# Patient Record
Sex: FEMALE | Race: WHITE | Employment: FULL TIME | URBAN - METROPOLITAN AREA
[De-identification: names, ages, dates, MRNs, and addresses within clinical notes are randomized per-mention and may not be internally consistent; named-entity substitution may affect disease eponyms.]

---

## 2019-08-16 ENCOUNTER — HOSPITAL ENCOUNTER (OUTPATIENT)
Age: 34
Discharge: HOME | End: 2019-08-16
Attending: FAMILY MEDICINE
Payer: COMMERCIAL

## 2019-08-16 VITALS
TEMPERATURE: 99 F | OXYGEN SATURATION: 98 % | DIASTOLIC BLOOD PRESSURE: 82 MMHG | RESPIRATION RATE: 16 BRPM | HEART RATE: 83 BPM | BODY MASS INDEX: 30 KG/M2 | WEIGHT: 185 LBS | SYSTOLIC BLOOD PRESSURE: 144 MMHG

## 2019-08-16 DIAGNOSIS — L73.2 HIDRADENITIS SUPPURATIVA: Primary | ICD-10-CM

## 2019-08-16 PROCEDURE — 99203 OFFICE O/P NEW LOW 30 MIN: CPT

## 2019-08-16 RX ORDER — PREDNISONE 20 MG/1
40 TABLET ORAL DAILY
Qty: 10 TABLET | Refills: 0 | Status: SHIPPED | OUTPATIENT
Start: 2019-08-16 | End: 2019-08-21

## 2019-08-16 RX ORDER — METOPROLOL SUCCINATE 25 MG/1
25 TABLET, EXTENDED RELEASE ORAL DAILY
COMMUNITY
End: 2020-11-09 | Stop reason: DRUGHIGH

## 2019-08-16 RX ORDER — CEPHALEXIN 500 MG/1
500 CAPSULE ORAL 4 TIMES DAILY
Qty: 28 CAPSULE | Refills: 0 | Status: SHIPPED | OUTPATIENT
Start: 2019-08-16 | End: 2019-08-23

## 2019-08-16 NOTE — ED PROVIDER NOTES
Patient Seen in: [facility] [place]    History   Patient presents with:  Abscess (integumentary)    Stated Complaint: skin abscess    HPI    47-year-old female with a history of vaginitis or Adam presents to the IC secondary to flare.   Pa Exam    General: Pleasant female in no acute distress  Neuro: Alert or x3. No focal deficits  Vascular: Peripheral pulses are intact. Extremities: Full range of motion  Skin: 8 x 8 cm area of tenderness, erythema, and induration at the right groin.   Cent

## 2019-08-16 NOTE — ED INITIAL ASSESSMENT (HPI)
Pt states having a hidradenitis flare up today. Pt called dermatologist today but was unable to get in today. Pt is worried that it is becoming infected.

## 2020-10-19 ENCOUNTER — TELEPHONE (OUTPATIENT)
Dept: FAMILY MEDICINE CLINIC | Facility: CLINIC | Age: 35
End: 2020-10-19

## 2020-10-19 NOTE — TELEPHONE ENCOUNTER
Pt called to schedule with Dr Arnav Adamson. I explained  is not accepting new patients. Pt states she is a friend of Dr Arnav Adamson and would like me to send a message requesting visit.      Thank you

## 2020-11-09 ENCOUNTER — OFFICE VISIT (OUTPATIENT)
Dept: FAMILY MEDICINE CLINIC | Facility: CLINIC | Age: 35
End: 2020-11-09
Payer: COMMERCIAL

## 2020-11-09 VITALS
WEIGHT: 174 LBS | RESPIRATION RATE: 14 BRPM | DIASTOLIC BLOOD PRESSURE: 80 MMHG | TEMPERATURE: 98 F | OXYGEN SATURATION: 98 % | BODY MASS INDEX: 27.31 KG/M2 | HEART RATE: 101 BPM | SYSTOLIC BLOOD PRESSURE: 120 MMHG | HEIGHT: 66.75 IN

## 2020-11-09 DIAGNOSIS — F41.9 ANXIETY: ICD-10-CM

## 2020-11-09 DIAGNOSIS — Z23 NEED FOR VACCINATION: ICD-10-CM

## 2020-11-09 DIAGNOSIS — I10 ESSENTIAL HYPERTENSION: Primary | ICD-10-CM

## 2020-11-09 PROCEDURE — 3079F DIAST BP 80-89 MM HG: CPT | Performed by: FAMILY MEDICINE

## 2020-11-09 PROCEDURE — 99072 ADDL SUPL MATRL&STAF TM PHE: CPT | Performed by: FAMILY MEDICINE

## 2020-11-09 PROCEDURE — 3008F BODY MASS INDEX DOCD: CPT | Performed by: FAMILY MEDICINE

## 2020-11-09 PROCEDURE — 90471 IMMUNIZATION ADMIN: CPT | Performed by: FAMILY MEDICINE

## 2020-11-09 PROCEDURE — 3074F SYST BP LT 130 MM HG: CPT | Performed by: FAMILY MEDICINE

## 2020-11-09 PROCEDURE — 99203 OFFICE O/P NEW LOW 30 MIN: CPT | Performed by: FAMILY MEDICINE

## 2020-11-09 PROCEDURE — 90686 IIV4 VACC NO PRSV 0.5 ML IM: CPT | Performed by: FAMILY MEDICINE

## 2020-11-09 RX ORDER — SERTRALINE HYDROCHLORIDE 100 MG/1
200 TABLET, FILM COATED ORAL DAILY
COMMUNITY
Start: 2019-01-31 | End: 2021-01-21

## 2020-11-09 NOTE — PROGRESS NOTES
Rosendo Brooks is a 37year old female. HPI:   Patient here to establish care. Her  sees me. My  Herb Shelton with her dad. Has been on sertraline since 2017 when she had antepartum depression, but has kept on sertraline to manage anxiety. Drinks per session: 1 or 2      Binge frequency: Never    Drug use: Yes      Types: Cannabis      Comment: weekends         REVIEW OF SYSTEMS:   GENERAL HEALTH: feels well otherwise   SKIN: denies any unusual skin lesions or rashes  HEENT: no URI symptoms

## 2021-01-21 ENCOUNTER — TELEPHONE (OUTPATIENT)
Dept: FAMILY MEDICINE CLINIC | Facility: CLINIC | Age: 36
End: 2021-01-21

## 2021-01-21 RX ORDER — SERTRALINE HYDROCHLORIDE 100 MG/1
100 TABLET, FILM COATED ORAL 2 TIMES DAILY
Qty: 180 TABLET | Refills: 2 | Status: SHIPPED | OUTPATIENT
Start: 2021-01-21 | End: 2021-01-22

## 2021-01-21 NOTE — TELEPHONE ENCOUNTER
Patient said about a month ago Dr. Arnav Adamson wrote scripts for Metoprolo and Sertraline but she never filled them. Has decided she would like to start them now. [facility] [place]. Please call back.

## 2021-01-21 NOTE — TELEPHONE ENCOUNTER
Per our office visit note she was already on sertraline and metorpolol (prescribed by another doc) and doing well on them and was going to call for refills when needed.   Please verify what dosages she is taking and fine to refill at those dosages for #90 2

## 2021-01-21 NOTE — TELEPHONE ENCOUNTER
Patient notified and verbalized understanding.    States she is taking:    Sertraline 100 mg BID  Metoprolol tartrate 25 mg BID     Uses [facility] on [address]    Routed to [address] to advise on refill

## 2021-01-22 RX ORDER — SERTRALINE HYDROCHLORIDE 100 MG/1
100 TABLET, FILM COATED ORAL 2 TIMES DAILY
Qty: 180 TABLET | Refills: 2 | Status: SHIPPED | OUTPATIENT
Start: 2021-01-22 | End: 2021-04-16

## 2021-01-22 NOTE — TELEPHONE ENCOUNTER
[address] [facility] called to clarify directions on the sertraline  Had them cancel the order as the notes [facility]    I called the pt to clarify the directions  She states that she takes the sertraline 100mg BID and the metoprolol BI

## 2021-04-15 ENCOUNTER — TELEPHONE (OUTPATIENT)
Dept: FAMILY MEDICINE CLINIC | Facility: CLINIC | Age: 36
End: 2021-04-15

## 2021-04-15 NOTE — TELEPHONE ENCOUNTER
Pt called, needs refills on Sertraline HCl 100 MG Oral Tab and metoprolol Tartrate 25 MG Oral Tab. Pharmacy-[facility] [address].   Please call pt at [phone]

## 2021-04-15 NOTE — TELEPHONE ENCOUNTER
Pt called, just got her first covid shot this morning and has a headache now. What can pt take for headache?   Please call pt at [phone]

## 2021-04-15 NOTE — TELEPHONE ENCOUNTER
LORENA    Pt reports she received covid vaccine this morning and now has headache. Was just inquiring to see if ibuprofen was okay to take with her hypertension.  Pt advised that headache is common side effect and to start with acetaminophen / tylenol for pain

## 2021-04-15 NOTE — TELEPHONE ENCOUNTER
LOV: 11/9/20   Last Refill:   Metoprolol 01/22/21 #180 2   Sertraline 01/22/21  #180 2     Pt requested meds be sent to new pharmacy

## 2021-04-16 RX ORDER — SERTRALINE HYDROCHLORIDE 100 MG/1
100 TABLET, FILM COATED ORAL 2 TIMES DAILY
Qty: 180 TABLET | Refills: 2 | Status: SHIPPED | OUTPATIENT
Start: 2021-04-16 | End: 2021-08-24

## 2021-04-16 NOTE — TELEPHONE ENCOUNTER
Hypertension Medications Protocol [id number]/16/2021 10:09 AM   CMP or BMP in past 12 months Protocol Details    Last serum creatinine< 2.0     Appointment in past 6 or next 3 months        Last refilled on 01/22/2021 for # 180 with 2 rf for both.    Last lab

## 2021-04-16 NOTE — TELEPHONE ENCOUNTER
Pt called checking status on refill and if we can get the refill called in today as pt is totally out.   Please call pt at [phone]

## 2021-07-12 ENCOUNTER — TELEPHONE (OUTPATIENT)
Dept: FAMILY MEDICINE CLINIC | Facility: CLINIC | Age: 36
End: 2021-07-12

## 2021-07-12 RX ORDER — SULFAMETHOXAZOLE AND TRIMETHOPRIM 800; 160 MG/1; MG/1
1 TABLET ORAL 2 TIMES DAILY
Qty: 6 TABLET | Refills: 0 | Status: SHIPPED | OUTPATIENT
Start: 2021-07-12 | End: 2021-07-15

## 2021-07-12 NOTE — TELEPHONE ENCOUNTER
PT CALLED AND ADV TYPICAL UTI SX'S. HAS BEEN TAKING AZO'S THE LAST 3 DAYS. OFFERED APT WITH [facility] AND ADV CAN'T COME IN-PT HAS KIDS AT HOME. LOOKING TO SEE IF SOMETHING CAN BE CALLED IN.     PLEASE ADV    THANK YOU

## 2021-07-12 NOTE — TELEPHONE ENCOUNTER
Patient has urgency and frequency and a little discomfort. Patient states can not come in due to sleeping children and would like something called in.   Patient states that hs been a while since having a UTI and cant remember what has taken before in the p

## 2021-07-12 NOTE — TELEPHONE ENCOUNTER
Please find out what symptoms she is having. Burning with urination? Frequency? Blood in urine? Fever? Nausea/vomiting? Flank or back pain?   If just the first 2 is likely UTI and ideally we send a sample to culture that was if first abx doesn't work we'l

## 2021-07-15 ENCOUNTER — TELEPHONE (OUTPATIENT)
Dept: FAMILY MEDICINE CLINIC | Facility: CLINIC | Age: 36
End: 2021-07-15

## 2021-07-15 ENCOUNTER — NURSE ONLY (OUTPATIENT)
Dept: FAMILY MEDICINE CLINIC | Facility: CLINIC | Age: 36
End: 2021-07-15
Payer: COMMERCIAL

## 2021-07-15 DIAGNOSIS — R35.0 FREQUENCY OF URINATION: ICD-10-CM

## 2021-07-15 DIAGNOSIS — R30.0 DYSURIA: ICD-10-CM

## 2021-07-15 DIAGNOSIS — R30.0 DYSURIA: Primary | ICD-10-CM

## 2021-07-15 LAB
BILIRUBIN: NEGATIVE
GLUCOSE (URINE DIPSTICK): NEGATIVE MG/DL
KETONES (URINE DIPSTICK): NEGATIVE MG/DL
MULTISTIX LOT#: 5077 NUMERIC
NITRITE, URINE: NEGATIVE
PH, URINE: 6 (ref 4.5–8)
PROTEIN (URINE DIPSTICK): 100 MG/DL
SPECIFIC GRAVITY: 1.03 (ref 1–1.03)
URINE-COLOR: YELLOW
UROBILINOGEN,SEMI-QN: 0.2 MG/DL (ref 0–1.9)

## 2021-07-15 PROCEDURE — 81003 URINALYSIS AUTO W/O SCOPE: CPT | Performed by: FAMILY MEDICINE

## 2021-07-15 PROCEDURE — 87086 URINE CULTURE/COLONY COUNT: CPT | Performed by: FAMILY MEDICINE

## 2021-07-15 PROCEDURE — 87186 SC STD MICRODIL/AGAR DIL: CPT | Performed by: FAMILY MEDICINE

## 2021-07-15 PROCEDURE — 87077 CULTURE AEROBIC IDENTIFY: CPT | Performed by: FAMILY MEDICINE

## 2021-07-15 RX ORDER — SULFAMETHOXAZOLE AND TRIMETHOPRIM 800; 160 MG/1; MG/1
1 TABLET ORAL 2 TIMES DAILY
Qty: 8 TABLET | Refills: 0 | Status: SHIPPED | OUTPATIENT
Start: 2021-07-15 | End: 2021-07-19

## 2021-07-15 NOTE — TELEPHONE ENCOUNTER
Reports she is taking her last dose of bactrim today, a 3 day regimen. The sx have decreased minimally but have not worsened. Was unable to come in for UA 7/12. C/O frequency, urgency, and discomfort. Denies any visible blood.

## 2021-07-15 NOTE — TELEPHONE ENCOUNTER
Having started the Bactrim, her Urine testing might be skewed, nevertheless I think its worth considering she is continuing to have her symptoms. Urine dip and urine Cx recommended.  Also while we are waiting on the URINE CX results - we can extend Bactrim

## 2021-07-15 NOTE — TELEPHONE ENCOUNTER
Pt called she just took her last pill of   Sulfamethoxazole-TMP DS (BACTRIM DS) 800-160 MG Oral Tab per tablet . She states that her symptoms are lessening but haven't gone away. She is thinking that she needs to come in for a urine sample.

## 2021-07-19 RX ORDER — NITROFURANTOIN 25; 75 MG/1; MG/1
100 CAPSULE ORAL 2 TIMES DAILY
Qty: 14 CAPSULE | Refills: 0 | Status: SHIPPED | OUTPATIENT
Start: 2021-07-19 | End: 2021-07-26

## 2021-08-24 ENCOUNTER — APPOINTMENT (OUTPATIENT)
Dept: CT IMAGING | Age: 36
End: 2021-08-24
Attending: NURSE PRACTITIONER
Payer: COMMERCIAL

## 2021-08-24 ENCOUNTER — HOSPITAL ENCOUNTER (OUTPATIENT)
Age: 36
Discharge: HOME | End: 2021-08-24
Payer: COMMERCIAL

## 2021-08-24 VITALS
HEART RATE: 62 BPM | HEIGHT: 67 IN | SYSTOLIC BLOOD PRESSURE: 141 MMHG | OXYGEN SATURATION: 97 % | DIASTOLIC BLOOD PRESSURE: 66 MMHG | WEIGHT: 170 LBS | TEMPERATURE: 98 F | RESPIRATION RATE: 22 BRPM | BODY MASS INDEX: 26.68 KG/M2

## 2021-08-24 DIAGNOSIS — R55 SYNCOPE, NEAR: ICD-10-CM

## 2021-08-24 DIAGNOSIS — F41.9 ANXIETY: ICD-10-CM

## 2021-08-24 DIAGNOSIS — R25.1 SHAKY: Primary | ICD-10-CM

## 2021-08-24 LAB
#MXD IC: 0.4 X10ˆ3/UL (ref 0.1–1)
ATRIAL RATE: 83 BPM
BUN BLD-MCNC: 9 MG/DL (ref 7–18)
CHLORIDE BLD-SCNC: 101 MMOL/L (ref 98–112)
CO2 BLD-SCNC: 21 MMOL/L (ref 21–32)
CREAT BLD-MCNC: 0.6 MG/DL
DDIMER WHOLE BLOOD: <200 NG/ML DDU (ref ?–400)
GLUCOSE BLD-MCNC: 82 MG/DL (ref 70–99)
HCT VFR BLD AUTO: 42.4 %
HCT VFR BLD CALC: 44 %
HGB BLD-MCNC: 15 G/DL
ISTAT IONIZED CALCIUM FOR CHEM 8: 1.18 MMOL/L (ref 1.12–1.32)
LYMPHOCYTES # BLD AUTO: 1.4 X10ˆ3/UL (ref 1–4)
LYMPHOCYTES NFR BLD AUTO: 16.5 %
MCH RBC QN AUTO: 32.9 PG (ref 26–34)
MCHC RBC AUTO-ENTMCNC: 35.4 G/DL (ref 31–37)
MCV RBC AUTO: 93 FL (ref 80–100)
MIXED CELL %: 4.6 %
NEUTROPHILS # BLD AUTO: 6.5 X10ˆ3/UL (ref 1.5–7.7)
NEUTROPHILS NFR BLD AUTO: 78.9 %
P AXIS: 62 DEGREES
P-R INTERVAL: 140 MS
PLATELET # BLD AUTO: 240 X10ˆ3/UL (ref 150–450)
POTASSIUM BLD-SCNC: 3.9 MMOL/L (ref 3.6–5.1)
Q-T INTERVAL: 382 MS
QRS DURATION: 94 MS
QTC CALCULATION (BEZET): 448 MS
R AXIS: 63 DEGREES
RBC # BLD AUTO: 4.56 X10ˆ6/UL
SARS-COV-2 RNA RESP QL NAA+PROBE: NOT DETECTED
SODIUM BLD-SCNC: 137 MMOL/L (ref 136–145)
T AXIS: 57 DEGREES
T4 FREE SERPL-MCNC: 0.8 NG/DL (ref 0.8–1.7)
TROPONIN I BLD-MCNC: <0.02 NG/ML
TSI SER-ACNC: 0.81 MIU/ML (ref 0.36–3.74)
VENTRICULAR RATE: 83 BPM
WBC # BLD AUTO: 8.3 X10ˆ3/UL (ref 4–11)

## 2021-08-24 PROCEDURE — 80047 BASIC METABLC PNL IONIZED CA: CPT | Performed by: NURSE PRACTITIONER

## 2021-08-24 PROCEDURE — 93000 ELECTROCARDIOGRAM COMPLETE: CPT | Performed by: NURSE PRACTITIONER

## 2021-08-24 PROCEDURE — 70450 CT HEAD/BRAIN W/O DYE: CPT | Performed by: NURSE PRACTITIONER

## 2021-08-24 PROCEDURE — 99204 OFFICE O/P NEW MOD 45 MIN: CPT | Performed by: NURSE PRACTITIONER

## 2021-08-24 PROCEDURE — 85025 COMPLETE CBC W/AUTO DIFF WBC: CPT | Performed by: NURSE PRACTITIONER

## 2021-08-24 PROCEDURE — U0002 COVID-19 LAB TEST NON-CDC: HCPCS | Performed by: NURSE PRACTITIONER

## 2021-08-24 PROCEDURE — 84443 ASSAY THYROID STIM HORMONE: CPT | Performed by: NURSE PRACTITIONER

## 2021-08-24 PROCEDURE — 85378 FIBRIN DEGRADE SEMIQUANT: CPT | Performed by: NURSE PRACTITIONER

## 2021-08-24 PROCEDURE — 84484 ASSAY OF TROPONIN QUANT: CPT | Performed by: NURSE PRACTITIONER

## 2021-08-24 PROCEDURE — 84439 ASSAY OF FREE THYROXINE: CPT | Performed by: NURSE PRACTITIONER

## 2021-08-24 RX ORDER — SODIUM CHLORIDE 9 MG/ML
1000 INJECTION, SOLUTION INTRAVENOUS ONCE
Status: COMPLETED | OUTPATIENT
Start: 2021-08-24 | End: 2021-08-24

## 2021-08-24 NOTE — ED INITIAL ASSESSMENT (HPI)
Not feeling well for 3 days. Very sweaty, this am shaky, dizzy, lightheaded. Woke this am with a cold sweat. Had to lay down, she felt she was going to pass out. Has not eaten.

## 2021-08-24 NOTE — ED PROVIDER NOTES
Patient Seen in: [facility]      History   No chief complaint on file.     Stated Complaint: shaky/blurred vision/weakness/sweats    HPI/Subjective:   68-year-old female who presents to the IC after suddenly waking up this morning with her husb Tobacco Use      Smoking status: Former Smoker        Packs/day: 0.50        Years: 9.00        Pack years: 4.5        Types: Cigarettes      Smokeless tobacco: Never Used      Tobacco comment: quit 11/07    Vaping Use      Vaping Use: Every day        Sub awake, alert, and oriented. The patient is cooperative. The patient has no focal neurologic deficits. The patient has normal speech and gait. MUSCULOSKELETAL: There is no tenderness or deformity. There is no limitation range of motion.   There is no ev of acute inflammation. SKULL:             No evidence for fracture or osseous abnormality. OTHER:             None. CONCLUSION:  There is no acute abnormality on the noncontrast CT of the head.    Dictated by (CST): Jaiden Velasquez MD on 8/24/2021

## 2021-08-25 ENCOUNTER — TELEPHONE (OUTPATIENT)
Dept: FAMILY MEDICINE CLINIC | Facility: CLINIC | Age: 36
End: 2021-08-25

## 2021-08-30 ENCOUNTER — OFFICE VISIT (OUTPATIENT)
Dept: FAMILY MEDICINE CLINIC | Facility: CLINIC | Age: 36
End: 2021-08-30
Payer: COMMERCIAL

## 2021-08-30 VITALS
WEIGHT: 166 LBS | OXYGEN SATURATION: 99 % | HEIGHT: 67 IN | HEART RATE: 77 BPM | DIASTOLIC BLOOD PRESSURE: 80 MMHG | BODY MASS INDEX: 26.06 KG/M2 | TEMPERATURE: 98 F | SYSTOLIC BLOOD PRESSURE: 120 MMHG

## 2021-08-30 DIAGNOSIS — F41.9 ANXIETY: ICD-10-CM

## 2021-08-30 DIAGNOSIS — Z13.1 DIABETES MELLITUS SCREENING: ICD-10-CM

## 2021-08-30 DIAGNOSIS — I10 ESSENTIAL HYPERTENSION: ICD-10-CM

## 2021-08-30 DIAGNOSIS — Z13.220 LIPID SCREENING: Primary | ICD-10-CM

## 2021-08-30 LAB
CHOLEST SMN-MCNC: 205 MG/DL (ref ?–200)
HDLC SERPL-MCNC: 55 MG/DL (ref 40–59)
LDLC SERPL CALC-MCNC: 134 MG/DL (ref ?–100)
NONHDLC SERPL-MCNC: 150 MG/DL (ref ?–130)
PATIENT FASTING Y/N/NP: NO
TRIGL SERPL-MCNC: 89 MG/DL (ref 30–149)
VLDLC SERPL CALC-MCNC: 16 MG/DL (ref 0–30)

## 2021-08-30 PROCEDURE — 99214 OFFICE O/P EST MOD 30 MIN: CPT | Performed by: FAMILY MEDICINE

## 2021-08-30 PROCEDURE — 83036 HEMOGLOBIN GLYCOSYLATED A1C: CPT | Performed by: FAMILY MEDICINE

## 2021-08-30 PROCEDURE — 3079F DIAST BP 80-89 MM HG: CPT | Performed by: FAMILY MEDICINE

## 2021-08-30 PROCEDURE — 80061 LIPID PANEL: CPT | Performed by: FAMILY MEDICINE

## 2021-08-30 PROCEDURE — 3074F SYST BP LT 130 MM HG: CPT | Performed by: FAMILY MEDICINE

## 2021-08-30 PROCEDURE — 3008F BODY MASS INDEX DOCD: CPT | Performed by: FAMILY MEDICINE

## 2021-08-30 RX ORDER — FLUOXETINE HYDROCHLORIDE 20 MG/1
20 CAPSULE ORAL DAILY
Qty: 90 CAPSULE | Refills: 0 | Status: SHIPPED | OUTPATIENT
Start: 2021-08-30 | End: 2021-12-03

## 2021-08-30 NOTE — PROGRESS NOTES
Catalina Babb is a 31year old female. HPI:   Patient here for insurance required labs and to discuss anxiety and some recent symptoms she has had.     She was on sertraline she thinks 100mg daily after her first child and during pregnancy with 2nd ch Age of Onset   • Hypertension Mother    • Cancer Mother         hx melanoma   • Hypertension Maternal Grandmother    • Colon Cancer Maternal Grandfather [de-identified]   • Hypertension Maternal Grandfather       Social History    Tobacco Use      Smoking status: Hao including SIs and to call immediately if that happens  -f/u in 4-6 weeks, sooner if needed  -encouraged therapy  Essential hypertension  -Well controlled, CPM   -is following up with cards and on a monitor now for the symptoms she had last week  -[facility] record

## 2021-08-31 LAB
EST. AVERAGE GLUCOSE BLD GHB EST-MCNC: 88 MG/DL (ref 68–126)
HBA1C MFR BLD HPLC: 4.7 % (ref ?–5.7)

## 2021-09-10 ENCOUNTER — TELEPHONE (OUTPATIENT)
Dept: FAMILY MEDICINE CLINIC | Facility: CLINIC | Age: 36
End: 2021-09-10

## 2021-09-10 NOTE — TELEPHONE ENCOUNTER
Patient advised of Doctor's note below. Patient verbalized understanding. No further questions at this time.     Future Appointments   Date Time Provider Neda Perdomo   9/13/2021 11:40 AM Selin Jhaveri MD [facility] KIRAN Berumen

## 2021-09-10 NOTE — TELEPHONE ENCOUNTER
Pt called to make an appt following cardiac testing (ekg and stress test). Pt is experience symptoms of shakiness. Pt wants to be seen asap. I scheduled her next week on the 16th. She asked if she can be seen sooner.      I put her on the waitlist but she w

## 2021-09-11 ENCOUNTER — TELEPHONE (OUTPATIENT)
Dept: FAMILY MEDICINE CLINIC | Facility: CLINIC | Age: 36
End: 2021-09-11

## 2021-09-11 NOTE — TELEPHONE ENCOUNTER
The amount of rectal bleeding she would have in order to cause those types of symptoms would be substantial. Is there like enough blood to fill the bowl? If not then she can set up an appt with Dr. Bridgett Garcia for evaluation.

## 2021-09-11 NOTE — TELEPHONE ENCOUNTER
Pt states she is having bleeding when using the restroom    PT has a history of hemorrhoids has discussed with OB and with Dr. Aniceto Edmondson    Pt states she does find bleeding or blood when she uses the restroom not only with a bowel movement.     Pt states she has

## 2021-09-11 NOTE — TELEPHONE ENCOUNTER
Patient called requesting a call from nurse/. She has been having a lot of rectal bleeding and wants to know if she should go to ER.     Please call back # [phone]

## 2021-09-11 NOTE — TELEPHONE ENCOUNTER
Pt was advised of information below    Has follow up with MM on Monday    Future Appointments   Date Time Provider Neda Perdomo   9/13/2021 11:40 AM Mary Rizvi MD [facility] KIRAN Schulte

## 2021-09-13 ENCOUNTER — OFFICE VISIT (OUTPATIENT)
Dept: FAMILY MEDICINE CLINIC | Facility: CLINIC | Age: 36
End: 2021-09-13
Payer: COMMERCIAL

## 2021-09-13 VITALS
HEART RATE: 92 BPM | DIASTOLIC BLOOD PRESSURE: 88 MMHG | OXYGEN SATURATION: 99 % | BODY MASS INDEX: 24.7 KG/M2 | WEIGHT: 157.38 LBS | SYSTOLIC BLOOD PRESSURE: 132 MMHG | HEIGHT: 67 IN | TEMPERATURE: 98 F | RESPIRATION RATE: 18 BRPM

## 2021-09-13 DIAGNOSIS — I10 ESSENTIAL HYPERTENSION: ICD-10-CM

## 2021-09-13 DIAGNOSIS — R07.89 ATYPICAL CHEST PAIN: ICD-10-CM

## 2021-09-13 DIAGNOSIS — F41.9 ANXIETY: ICD-10-CM

## 2021-09-13 DIAGNOSIS — R25.1 TREMULOUSNESS: Primary | ICD-10-CM

## 2021-09-13 PROCEDURE — 3075F SYST BP GE 130 - 139MM HG: CPT | Performed by: FAMILY MEDICINE

## 2021-09-13 PROCEDURE — 3079F DIAST BP 80-89 MM HG: CPT | Performed by: FAMILY MEDICINE

## 2021-09-13 PROCEDURE — 3008F BODY MASS INDEX DOCD: CPT | Performed by: FAMILY MEDICINE

## 2021-09-13 PROCEDURE — 80053 COMPREHEN METABOLIC PANEL: CPT | Performed by: FAMILY MEDICINE

## 2021-09-13 PROCEDURE — 99214 OFFICE O/P EST MOD 30 MIN: CPT | Performed by: FAMILY MEDICINE

## 2021-09-13 PROCEDURE — 85025 COMPLETE CBC W/AUTO DIFF WBC: CPT | Performed by: FAMILY MEDICINE

## 2021-09-13 RX ORDER — AMLODIPINE BESYLATE 5 MG/1
5 TABLET ORAL DAILY
COMMUNITY
Start: 2021-09-09

## 2021-09-13 NOTE — PROGRESS NOTES
[facility] Family Medicine Office Note  Chief Complaint:   Here for f/u BP and Tachycardic x 3 weeks. Echo done on 9/10/21 and Holter also done.      Palpitations/dizziness, history of intermittent substernal crushing chest pain on the L side stop Metoprolol, and to treat with Amlodipine 5 mg to treat the High BP. Checking and recording BP at home and to keep log.  If ECHO is normal wanted patient to follow up with PMD.   Also recommended low salt diet and daily exercise and maintain a normal BM Counseling given: Not Answered  Comment: quit 11/07       REVIEW OF SYSTEMS:     All other review of systems are negative except for as mentioned in the HPI      EXAM:   /88   Pulse 92   Temp 97.8 °F (36.6 °C) (Temporal)   Resp 18   Ht 5' 7\" (1.70 This billing was spent on reviewing prior hospital / clinic noted, labs, medications, other tests and medical decision making.   Appropriate medical decision-making and tests are ordered as detailed in the plan of care above      Meds & Refills for this

## 2021-09-15 ENCOUNTER — TELEPHONE (OUTPATIENT)
Dept: FAMILY MEDICINE CLINIC | Facility: CLINIC | Age: 36
End: 2021-09-15

## 2021-09-15 NOTE — TELEPHONE ENCOUNTER
----- Message from Seamus Ford MD sent at 9/14/2021  2:21 PM CDT -----  [facility] sent to the patient   Jim Haynes   I just reviewed your test results. Your Hgb was elevated slightly, and your liver function was also elevated.  Stop all natural supplem

## 2021-09-15 NOTE — TELEPHONE ENCOUNTER
[facility] sent to pt regarding doctor's note belwo  Routing to nurse pool for follow-up message read by pt      Recall placed for 3-4 week follow-up recheck liver and Hgb

## 2021-09-27 ENCOUNTER — OFFICE VISIT (OUTPATIENT)
Dept: FAMILY MEDICINE CLINIC | Facility: CLINIC | Age: 36
End: 2021-09-27
Payer: COMMERCIAL

## 2021-09-27 VITALS
OXYGEN SATURATION: 98 % | TEMPERATURE: 98 F | BODY MASS INDEX: 25 KG/M2 | WEIGHT: 160 LBS | SYSTOLIC BLOOD PRESSURE: 122 MMHG | DIASTOLIC BLOOD PRESSURE: 70 MMHG | HEART RATE: 62 BPM | RESPIRATION RATE: 16 BRPM

## 2021-09-27 DIAGNOSIS — Z01.812 ENCOUNTER FOR PREPROCEDURE SCREENING LABORATORY TESTING FOR COVID-19: ICD-10-CM

## 2021-09-27 DIAGNOSIS — R74.01 ELEVATED TRANSAMINASE LEVEL: Primary | ICD-10-CM

## 2021-09-27 DIAGNOSIS — Z20.822 ENCOUNTER FOR PREPROCEDURE SCREENING LABORATORY TESTING FOR COVID-19: ICD-10-CM

## 2021-09-27 DIAGNOSIS — R79.89 ABNORMAL CBC: ICD-10-CM

## 2021-09-27 DIAGNOSIS — R07.9 CHEST PAIN, UNSPECIFIED TYPE: ICD-10-CM

## 2021-09-27 PROCEDURE — 99214 OFFICE O/P EST MOD 30 MIN: CPT | Performed by: FAMILY MEDICINE

## 2021-09-27 PROCEDURE — 3078F DIAST BP <80 MM HG: CPT | Performed by: FAMILY MEDICINE

## 2021-09-27 PROCEDURE — 3074F SYST BP LT 130 MM HG: CPT | Performed by: FAMILY MEDICINE

## 2021-10-01 ENCOUNTER — LAB ENCOUNTER (OUTPATIENT)
Dept: LAB | Age: 36
End: 2021-10-01
Attending: FAMILY MEDICINE
Payer: COMMERCIAL

## 2021-10-01 DIAGNOSIS — Z20.822 ENCOUNTER FOR PREPROCEDURE SCREENING LABORATORY TESTING FOR COVID-19: ICD-10-CM

## 2021-10-01 DIAGNOSIS — Z01.812 ENCOUNTER FOR PREPROCEDURE SCREENING LABORATORY TESTING FOR COVID-19: ICD-10-CM

## 2021-10-01 DIAGNOSIS — R07.9 CHEST PAIN, UNSPECIFIED TYPE: ICD-10-CM

## 2021-10-04 ENCOUNTER — HOSPITAL ENCOUNTER (OUTPATIENT)
Dept: CV DIAGNOSTICS | Facility: HOSPITAL | Age: 36
Discharge: HOME | End: 2021-10-04
Attending: FAMILY MEDICINE
Payer: COMMERCIAL

## 2021-10-04 DIAGNOSIS — R07.9 CHEST PAIN, UNSPECIFIED TYPE: ICD-10-CM

## 2021-10-04 PROCEDURE — 93018 CV STRESS TEST I&R ONLY: CPT | Performed by: FAMILY MEDICINE

## 2021-10-04 PROCEDURE — 93017 CV STRESS TEST TRACING ONLY: CPT | Performed by: FAMILY MEDICINE

## 2021-10-15 ENCOUNTER — OFFICE VISIT (OUTPATIENT)
Dept: FAMILY MEDICINE CLINIC | Facility: CLINIC | Age: 36
End: 2021-10-15
Payer: COMMERCIAL

## 2021-10-15 VITALS
RESPIRATION RATE: 16 BRPM | TEMPERATURE: 98 F | SYSTOLIC BLOOD PRESSURE: 108 MMHG | DIASTOLIC BLOOD PRESSURE: 72 MMHG | WEIGHT: 162.25 LBS | BODY MASS INDEX: 25 KG/M2 | OXYGEN SATURATION: 99 % | HEART RATE: 66 BPM

## 2021-10-15 DIAGNOSIS — E87.1 HYPONATREMIA: Primary | ICD-10-CM

## 2021-10-15 DIAGNOSIS — M94.0 COSTOCHONDRITIS: ICD-10-CM

## 2021-10-15 DIAGNOSIS — R79.89 ABNORMAL CBC: ICD-10-CM

## 2021-10-15 DIAGNOSIS — D58.2 ELEVATED HEMOGLOBIN (HCC): ICD-10-CM

## 2021-10-15 DIAGNOSIS — R07.89 ATYPICAL CHEST PAIN: ICD-10-CM

## 2021-10-15 PROCEDURE — 3078F DIAST BP <80 MM HG: CPT | Performed by: FAMILY MEDICINE

## 2021-10-15 PROCEDURE — 99213 OFFICE O/P EST LOW 20 MIN: CPT | Performed by: FAMILY MEDICINE

## 2021-10-15 PROCEDURE — 85025 COMPLETE CBC W/AUTO DIFF WBC: CPT | Performed by: FAMILY MEDICINE

## 2021-10-15 PROCEDURE — 3074F SYST BP LT 130 MM HG: CPT | Performed by: FAMILY MEDICINE

## 2021-10-15 PROCEDURE — 80053 COMPREHEN METABOLIC PANEL: CPT | Performed by: FAMILY MEDICINE

## 2021-10-15 RX ORDER — CYCLOBENZAPRINE HCL 10 MG
10 TABLET ORAL NIGHTLY
Qty: 15 TABLET | Refills: 0 | Status: SHIPPED | OUTPATIENT
Start: 2021-10-15 | End: 2021-10-30

## 2021-10-15 NOTE — PROGRESS NOTES
[facility] Family Medicine Office Note  Chief Complaint:   Patient presents with: Follow - Up: Patient states she has had a good week. Still having chest pain but at a 3. No dizziness/palpations.        HPI:   This is a 31year old female coming History:  Family History   Problem Relation Age of Onset   • Hypertension Mother    • Cancer Mother         hx melanoma   • Hypertension Maternal Grandmother    • Colon Cancer Maternal Grandfather [de-identified]   • Hypertension Maternal Grandfather      Allergies:  N (14); Future  - COMP METABOLIC PANEL (14)  - VENIPUNCTURE    2. Elevated hemoglobin ([address]  - VENIPUNCTURE    3. Atypical chest pain  - VENIPUNCTURE    4. Costochondritis  - VENIPUNCTURE    5.  Abnormal CBC  - CBC WITH DIFFERENTIAL WITH PLATELET  - Primo Peters Restless leg syndrome     Anxiety     GERD (gastroesophageal reflux disease)     Hidradenitis     Neoplasm of uncertain behavior of skin     Benign neoplasm of skin of trunk, except scrotum     Benign neoplasm of skin of lower limb, including hip     Cellu

## 2021-12-03 RX ORDER — FLUOXETINE HYDROCHLORIDE 20 MG/1
20 CAPSULE ORAL DAILY
Qty: 90 CAPSULE | Refills: 0 | Status: SHIPPED | OUTPATIENT
Start: 2021-12-03

## 2021-12-03 NOTE — TELEPHONE ENCOUNTER
Routing to provider per protocol. FLUoxetine 20 MG Oral Cap  Last refilled on 8/30/21 for #90  with 0 rf. Last labs 10/15/21. Last seen on 9/27/21. No future appointments. Thank you.

## 2021-12-03 NOTE — TELEPHONE ENCOUNTER
Hypertension Medications Protocol Passed 12/03/2021 04:31 PM   Protocol Details  CMP or BMP in past 12 months    Last serum creatinine< 2.0    Appointment in past 6 or next 3 months        metoprolol Tartrate 25 MG Oral Tab  Last refilled on 4/16/21 #180

## 2022-03-01 RX ORDER — FLUOXETINE HYDROCHLORIDE 20 MG/1
CAPSULE ORAL
Qty: 90 CAPSULE | Refills: 0 | Status: SHIPPED | OUTPATIENT
Start: 2022-03-01

## 2022-03-01 NOTE — TELEPHONE ENCOUNTER
Routing to provider per protocol. FLUoxetine 20 MG Oral Cap  Last refilled on 12/3/21 for #90  with 0 rf. Last labs 10/15/21. Last seen on 10/15/21. No future appointments. Thank you.

## 2022-05-28 ENCOUNTER — TELEPHONE (OUTPATIENT)
Dept: FAMILY MEDICINE CLINIC | Facility: CLINIC | Age: 37
End: 2022-05-28

## 2022-05-28 RX ORDER — AMLODIPINE BESYLATE 5 MG/1
5 TABLET ORAL DAILY
Qty: 90 TABLET | Refills: 1 | Status: SHIPPED | OUTPATIENT
Start: 2022-05-28 | End: 2022-11-24

## 2022-05-28 RX ORDER — AMLODIPINE BESYLATE 5 MG/1
5 TABLET ORAL DAILY
Refills: 0 | OUTPATIENT
Start: 2022-05-28

## 2022-05-28 RX ORDER — FLUOXETINE HYDROCHLORIDE 20 MG/1
CAPSULE ORAL
Qty: 90 CAPSULE | Refills: 0 | Status: SHIPPED | OUTPATIENT
Start: 2022-05-28

## 2022-05-28 RX ORDER — AMLODIPINE BESYLATE 5 MG/1
TABLET ORAL
Qty: 90 TABLET | Refills: 1 | OUTPATIENT
Start: 2022-05-28

## 2022-05-28 NOTE — TELEPHONE ENCOUNTER
LVM for pt advising refills sent and to CB with f/u for MM and office number provided to call back and schedule.

## 2022-05-28 NOTE — TELEPHONE ENCOUNTER
So is she taking Amlodipine along with the Metoprolol or not ? Her blood pressure are well controlled at this time ?

## 2022-05-28 NOTE — TELEPHONE ENCOUNTER
Does she see cardiology ? I will Rx Amlodipine and she should be Metoprolol as well. Will need her to see me this month. Please schedule her.

## 2022-05-28 NOTE — TELEPHONE ENCOUNTER
Fluoxetine last refilled 3/1/22 for #90 with 0 RF  Please see telephone encounter from 5/28/22 regarding request for the Amlodipine  LOV with MM 10/15/21  No future appt with pcp

## 2022-05-28 NOTE — TELEPHONE ENCOUNTER
LOV: 10/15/21   Last Refill: 09/09/21 historical    No future appointments.     BP Readings from Last 2 Encounters:  10/15/21 : 108/72  09/27/21 : 122/70

## 2022-05-28 NOTE — TELEPHONE ENCOUNTER
She is calling requesting the Amlodipine that she has been out of for 2 days now. No mention of the Metoprolol.

## 2022-05-28 NOTE — TELEPHONE ENCOUNTER
Patient states this is her 2nd day without medication and she feels her blood pressure is elevated    Requests refill    amLODIPine 5 MG Oral Tab    [facility]

## 2022-05-28 NOTE — TELEPHONE ENCOUNTER
Patient returning call. Patient is not seeing an Endocrinologist. Patient also informs she is taking Metoprolol, but not on schedule. Patient did say she took 2 today. Patient was informed to set up an appointment wit her PCP. Patient will call back, she is expecting [de-identified] people to come over for holiday party.

## 2022-05-31 NOTE — TELEPHONE ENCOUNTER
Metoprolol works well when taking the appropriate dose regularly. Missing doses can cause rebound hypertension. She will need to touch base with me preferably in-person (with her BP machine), however if short on time, we can try and straighten things out on a video visit as well. Please let patient know. Thank you.

## 2022-05-31 NOTE — TELEPHONE ENCOUNTER
Pt called back to clarify message. She is taking her amlodipine and metoprolol regularly. She just had the medications filled at different times so they are not refilled at the same time. Advised pt she still needs to come in to see Dr. Lali Huddleston. She has small kids and preferred to do a video visit. Pt has been scheduled on:    Future Appointments   Date Time Provider Neda Perdomo   6/15/2022  4:40 PM Seamus Figueredo MD [facility] Amy Zheng to Dr. Lali Huddleston.

## 2022-06-30 ENCOUNTER — PATIENT MESSAGE (OUTPATIENT)
Dept: FAMILY MEDICINE CLINIC | Facility: CLINIC | Age: 37
End: 2022-06-30

## 2022-06-30 RX ORDER — ALPRAZOLAM 0.25 MG/1
0.25 TABLET ORAL EVERY 6 HOURS PRN
Qty: 2 TABLET | Refills: 1 | Status: SHIPPED | OUTPATIENT
Start: 2022-06-30 | End: 2022-07-02

## 2022-08-23 RX ORDER — FLUOXETINE HYDROCHLORIDE 20 MG/1
CAPSULE ORAL
Qty: 90 CAPSULE | Refills: 0 | Status: SHIPPED | OUTPATIENT
Start: 2022-08-23

## 2022-08-23 NOTE — TELEPHONE ENCOUNTER
No refill protocol for this medication. Last refill: 5/28/2022 #90 with 0 refills  Last Visit: 10/15/2021  Next Visit: No future appointments. Forward to Dr. Siddharth Villa please advise on refills. Thanks.

## 2022-11-19 NOTE — TELEPHONE ENCOUNTER
Routing to provider per protocol. FLUOXETINE 20 MG Oral Cap  Last refilled on 8/23/22 for #90  with 0 rf. Last labs 10/15/21. Last seen on 10/15/21. No future appointments. Thank you.

## 2022-11-20 ENCOUNTER — HOSPITAL ENCOUNTER (OUTPATIENT)
Age: 37
Discharge: HOME | End: 2022-11-20
Payer: COMMERCIAL

## 2022-11-20 ENCOUNTER — APPOINTMENT (OUTPATIENT)
Dept: GENERAL RADIOLOGY | Age: 37
End: 2022-11-20
Attending: NURSE PRACTITIONER
Payer: COMMERCIAL

## 2022-11-20 VITALS
OXYGEN SATURATION: 98 % | TEMPERATURE: 98 F | WEIGHT: 170 LBS | HEIGHT: 67 IN | DIASTOLIC BLOOD PRESSURE: 81 MMHG | BODY MASS INDEX: 26.68 KG/M2 | HEART RATE: 76 BPM | RESPIRATION RATE: 18 BRPM | SYSTOLIC BLOOD PRESSURE: 133 MMHG

## 2022-11-20 DIAGNOSIS — J20.8 ACUTE VIRAL BRONCHITIS: Primary | ICD-10-CM

## 2022-11-20 PROCEDURE — 99213 OFFICE O/P EST LOW 20 MIN: CPT | Performed by: NURSE PRACTITIONER

## 2022-11-20 PROCEDURE — 71046 X-RAY EXAM CHEST 2 VIEWS: CPT | Performed by: NURSE PRACTITIONER

## 2022-11-20 RX ORDER — FLUOXETINE HYDROCHLORIDE 20 MG/1
CAPSULE ORAL
Qty: 90 CAPSULE | Refills: 0 | Status: SHIPPED | OUTPATIENT
Start: 2022-11-20

## 2022-11-20 RX ORDER — PREDNISONE 20 MG/1
20 TABLET ORAL 2 TIMES DAILY
Qty: 10 TABLET | Refills: 0 | Status: SHIPPED | OUTPATIENT
Start: 2022-11-20 | End: 2022-11-25

## 2022-11-20 RX ORDER — BENZONATATE 100 MG/1
100 CAPSULE ORAL 3 TIMES DAILY PRN
Qty: 30 CAPSULE | Refills: 0 | Status: SHIPPED | OUTPATIENT
Start: 2022-11-20 | End: 2022-12-20

## 2022-11-20 RX ORDER — ALBUTEROL SULFATE 90 UG/1
2 AEROSOL, METERED RESPIRATORY (INHALATION) EVERY 4 HOURS PRN
Qty: 1 EACH | Refills: 0 | Status: SHIPPED | OUTPATIENT
Start: 2022-11-20 | End: 2022-12-20

## 2022-11-20 NOTE — DISCHARGE INSTRUCTIONS
Follow-up with your primary care physician in one week if symptoms have not improved or symptoms are starting to get worse. Increase fluids, keep well-hydrated. Take Tylenol and Motrin for fever and pain. Take the steroids twice daily for next 5 days  Use inhaler as needed  Tessalon Perles for the Cough  Return to the Emergency Room If Your Symptoms Returns.

## 2023-01-25 ENCOUNTER — OFFICE VISIT (OUTPATIENT)
Dept: FAMILY MEDICINE CLINIC | Facility: CLINIC | Age: 38
End: 2023-01-25
Payer: COMMERCIAL

## 2023-01-25 VITALS
HEART RATE: 70 BPM | SYSTOLIC BLOOD PRESSURE: 132 MMHG | HEIGHT: 67 IN | BODY MASS INDEX: 29.45 KG/M2 | TEMPERATURE: 98 F | DIASTOLIC BLOOD PRESSURE: 78 MMHG | WEIGHT: 187.63 LBS | OXYGEN SATURATION: 98 %

## 2023-01-25 DIAGNOSIS — I10 ESSENTIAL HYPERTENSION: Primary | ICD-10-CM

## 2023-01-25 DIAGNOSIS — Z51.81 MEDICATION MONITORING ENCOUNTER: ICD-10-CM

## 2023-01-25 PROCEDURE — 3075F SYST BP GE 130 - 139MM HG: CPT | Performed by: FAMILY MEDICINE

## 2023-01-25 PROCEDURE — 99213 OFFICE O/P EST LOW 20 MIN: CPT | Performed by: FAMILY MEDICINE

## 2023-01-25 PROCEDURE — 3008F BODY MASS INDEX DOCD: CPT | Performed by: FAMILY MEDICINE

## 2023-01-25 PROCEDURE — 3078F DIAST BP <80 MM HG: CPT | Performed by: FAMILY MEDICINE

## 2023-01-25 RX ORDER — METOPROLOL SUCCINATE 50 MG/1
50 TABLET, EXTENDED RELEASE ORAL DAILY
Qty: 90 TABLET | Refills: 1 | Status: SHIPPED | OUTPATIENT
Start: 2023-01-25 | End: 2023-07-24

## 2023-02-11 ENCOUNTER — HOSPITAL ENCOUNTER (OUTPATIENT)
Age: 38
Discharge: HOME | End: 2023-02-11
Payer: COMMERCIAL

## 2023-02-11 VITALS
TEMPERATURE: 98 F | OXYGEN SATURATION: 97 % | HEIGHT: 67 IN | SYSTOLIC BLOOD PRESSURE: 118 MMHG | RESPIRATION RATE: 20 BRPM | BODY MASS INDEX: 28.25 KG/M2 | DIASTOLIC BLOOD PRESSURE: 72 MMHG | HEART RATE: 104 BPM | WEIGHT: 180 LBS

## 2023-02-11 DIAGNOSIS — J02.0 STREPTOCOCCAL SORE THROAT: ICD-10-CM

## 2023-02-11 DIAGNOSIS — R68.83 CHILLS: Primary | ICD-10-CM

## 2023-02-11 LAB
POCT INFLUENZA A: NEGATIVE
POCT INFLUENZA B: NEGATIVE
S PYO AG THROAT QL: POSITIVE
SARS-COV-2 RNA RESP QL NAA+PROBE: NOT DETECTED

## 2023-02-11 PROCEDURE — 87502 INFLUENZA DNA AMP PROBE: CPT | Performed by: NURSE PRACTITIONER

## 2023-02-11 PROCEDURE — 87880 STREP A ASSAY W/OPTIC: CPT | Performed by: NURSE PRACTITIONER

## 2023-02-11 PROCEDURE — 99213 OFFICE O/P EST LOW 20 MIN: CPT | Performed by: NURSE PRACTITIONER

## 2023-02-11 PROCEDURE — U0002 COVID-19 LAB TEST NON-CDC: HCPCS | Performed by: NURSE PRACTITIONER

## 2023-02-11 RX ORDER — AMOXICILLIN 500 MG/1
500 TABLET, FILM COATED ORAL 2 TIMES DAILY
Qty: 20 TABLET | Refills: 0 | Status: SHIPPED | OUTPATIENT
Start: 2023-02-11 | End: 2023-02-21

## 2023-02-11 RX ORDER — FLUCONAZOLE 150 MG/1
150 TABLET ORAL ONCE
Qty: 1 TABLET | Refills: 0 | Status: SHIPPED | OUTPATIENT
Start: 2023-02-11 | End: 2023-02-11

## 2023-02-16 RX ORDER — FLUOXETINE HYDROCHLORIDE 20 MG/1
CAPSULE ORAL
Qty: 90 CAPSULE | Refills: 0 | Status: SHIPPED | OUTPATIENT
Start: 2023-02-16

## 2023-02-16 NOTE — TELEPHONE ENCOUNTER
Last refilled 11/20/22 for #90 with 0 RF  LOV with MM 1/25/23  No future appt with MM  Protocol: none

## 2023-05-20 RX ORDER — FLUOXETINE HYDROCHLORIDE 20 MG/1
CAPSULE ORAL
Qty: 90 CAPSULE | Refills: 0 | Status: SHIPPED | OUTPATIENT
Start: 2023-05-20

## 2023-05-22 ENCOUNTER — TELEPHONE (OUTPATIENT)
Dept: FAMILY MEDICINE CLINIC | Facility: CLINIC | Age: 38
End: 2023-05-22

## 2023-05-22 DIAGNOSIS — I10 ESSENTIAL HYPERTENSION: Primary | ICD-10-CM

## 2023-05-22 RX ORDER — AMLODIPINE BESYLATE 5 MG/1
5 TABLET ORAL DAILY
Qty: 90 TABLET | Refills: 1 | Status: SHIPPED | OUTPATIENT
Start: 2023-05-22 | End: 2023-11-18

## 2023-05-22 NOTE — TELEPHONE ENCOUNTER
Rx sent.       BP Readings from Last 3 Encounters:  02/11/23 : 118/72  01/25/23 : 132/78  11/20/22 : 133/81

## 2023-05-22 NOTE — TELEPHONE ENCOUNTER
[facility] [place] [address] [place] [address] [phone], [address] [facility] [place] [address]   Phone: [phone] Fax: [phone]   Hours: Not open 24 hours       PATIENT SAYS SHE WAS PRESCRIBED AMLODIPINE BY CARDIOLOGY. SHE SAYS THE CARDIOLOGIST ASKED PATIENT IF HER PCP CAN CONTINUE THIS MEDICATION BECAUSE PATIENT DOES NOT NEED TO SEE CARDIOLOGY.

## 2023-06-18 ENCOUNTER — PATIENT MESSAGE (OUTPATIENT)
Dept: FAMILY MEDICINE CLINIC | Facility: CLINIC | Age: 38
End: 2023-06-18

## 2023-06-19 NOTE — TELEPHONE ENCOUNTER
From: Denzel Unger  To: Seamus Blackburn MD  Sent: 6/18/2023 8:37 PM CDT  Subject: Jaye Santillan got a tick today. Latched for a few hours. Is there any preventative antibiotic to be on? We have the tick saved in a plastic baggie. Should we get it tested?

## 2023-07-09 DIAGNOSIS — I10 ESSENTIAL HYPERTENSION: ICD-10-CM

## 2023-07-09 DIAGNOSIS — Z51.81 MEDICATION MONITORING ENCOUNTER: ICD-10-CM

## 2023-07-10 RX ORDER — METOPROLOL SUCCINATE 50 MG/1
50 TABLET, EXTENDED RELEASE ORAL DAILY
Qty: 30 TABLET | Refills: 0 | Status: SHIPPED | OUTPATIENT
Start: 2023-07-10 | End: 2023-08-09

## 2023-07-10 NOTE — TELEPHONE ENCOUNTER
Left detailed message to voicemail (per verbal release form consent with confirmed identifying message) of APRN's note below. Patient was advised to call office back - needs to schedule appt, and/or with any questions/concerns.

## 2023-07-10 NOTE — TELEPHONE ENCOUNTER
LOV 01/25/23  Last labs 10/15/21  Last refill on 01/25/23, for #90 tabs, with 1 refills  metoprolol succinate ER 50 MG Oral Tablet 24 Hr   Hypertension Medications Protocol [id number]/09/2023 10:15 AM   Protocol Details CMP or BMP in past 12 months    Last serum creatinine< 2.0    Appointment in past 6 or next 3 months     No future appointments. Order(s) pending, please review. Thank you.

## 2023-08-18 RX ORDER — FLUOXETINE HYDROCHLORIDE 20 MG/1
20 CAPSULE ORAL DAILY
Qty: 90 CAPSULE | Refills: 0 | Status: SHIPPED | OUTPATIENT
Start: 2023-08-18

## 2023-08-18 NOTE — TELEPHONE ENCOUNTER
LOV 01/25/23  Last refill on 05/20/23, for #90 caps, with 0 refills  FLUOXETINE 20 MG Oral Cap     No future appointments. Order(s) pending, please review. Thank you.

## 2023-08-19 NOTE — TELEPHONE ENCOUNTER
Pt failed refill protocol for the following reasons:    Hypertension Medications Protocol [id number]/19/2023 07:32 AM   Protocol Details CMP or BMP in past 12 months    Appointment in past 6 or next 3 months       Last refill: 7/10/2023 #30 with 0 refills  Last appt: 1/25/2023   Next appt: No future appointments. Forward to FLOR Myers, please advise on refills. Thank you.

## 2023-08-20 RX ORDER — METOPROLOL SUCCINATE 50 MG/1
50 TABLET, EXTENDED RELEASE ORAL DAILY
Qty: 30 TABLET | Refills: 0 | Status: SHIPPED | OUTPATIENT
Start: 2023-08-20

## 2023-09-15 NOTE — TELEPHONE ENCOUNTER
Patient notified via detailed voicemail left at cell number (ok per  HIPAA consent)    Advised once scheduled, refill can be sent so she will not run out

## 2023-09-15 NOTE — TELEPHONE ENCOUNTER
Pt failed refill protocol for the following reasons:  Name from pharmacy: METOPROLOL SUCC ER 50 MG TAB          Will file in chart as: METOPROLOL SUCCINATE ER 50 MG Oral Tablet 24 Hr    Sig: TAKE 1 TABLET BY MOUTH EVERY DAY    Disp: 30 tablet    Refills: 0 (Pharmacy requested: Not specified)    Start: 9/15/2023    Class: Normal    Non-formulary    Last ordered: 3 weeks ago (8/20/2023) by FLOR Wei    Last refill: 8/24/2023    Rx #: [id number]    Hypertension Medications Protocol Kufoyo85/15/2023 10:32 AM   Protocol Details CMP or BMP in past 12 months    Appointment in past 6 or next 3 months    Last serum creatinine< 2.0      [facility] [address] [place] [address] [phone], [phone]         Last refill: 8/20/23  Last appt: 1/25/23  Next appt: No future appointments. Last CMP 10/15/21    Forward to Nurse Practitioner Aure Salcedo, please advise on refills. Thank you.

## 2023-09-21 RX ORDER — METOPROLOL SUCCINATE 50 MG/1
50 TABLET, EXTENDED RELEASE ORAL DAILY
Qty: 30 TABLET | Refills: 0 | OUTPATIENT
Start: 2023-09-21

## 2023-09-22 ENCOUNTER — OFFICE VISIT (OUTPATIENT)
Dept: FAMILY MEDICINE CLINIC | Facility: CLINIC | Age: 38
End: 2023-09-22
Payer: COMMERCIAL

## 2023-09-22 VITALS
WEIGHT: 177 LBS | DIASTOLIC BLOOD PRESSURE: 80 MMHG | BODY MASS INDEX: 27.78 KG/M2 | HEIGHT: 67 IN | SYSTOLIC BLOOD PRESSURE: 120 MMHG | OXYGEN SATURATION: 98 % | RESPIRATION RATE: 16 BRPM | HEART RATE: 86 BPM | TEMPERATURE: 98 F

## 2023-09-22 DIAGNOSIS — I10 ESSENTIAL HYPERTENSION: ICD-10-CM

## 2023-09-22 DIAGNOSIS — R00.0 RAPID RESTING HEART RATE: Primary | ICD-10-CM

## 2023-09-22 DIAGNOSIS — F41.9 ANXIETY: ICD-10-CM

## 2023-09-22 LAB
ALBUMIN SERPL-MCNC: 4.4 G/DL (ref 3.4–5)
ALBUMIN/GLOB SERPL: 1.1 {RATIO} (ref 1–2)
ALP LIVER SERPL-CCNC: 46 U/L
ALT SERPL-CCNC: 106 U/L
ANION GAP SERPL CALC-SCNC: 4 MMOL/L (ref 0–18)
AST SERPL-CCNC: 50 U/L (ref 15–37)
BILIRUB SERPL-MCNC: 0.6 MG/DL (ref 0.1–2)
BUN BLD-MCNC: 10 MG/DL (ref 7–18)
CALCIUM BLD-MCNC: 9.5 MG/DL (ref 8.5–10.1)
CHLORIDE SERPL-SCNC: 103 MMOL/L (ref 98–112)
CO2 SERPL-SCNC: 29 MMOL/L (ref 21–32)
CREAT BLD-MCNC: 0.84 MG/DL
EGFRCR SERPLBLD CKD-EPI 2021: 92 ML/MIN/1.73M2 (ref 60–?)
FASTING STATUS PATIENT QL REPORTED: NO
GLOBULIN PLAS-MCNC: 4.1 G/DL (ref 2.8–4.4)
GLUCOSE BLD-MCNC: 95 MG/DL (ref 70–99)
OSMOLALITY SERPL CALC.SUM OF ELEC: 281 MOSM/KG (ref 275–295)
POTASSIUM SERPL-SCNC: 4.3 MMOL/L (ref 3.5–5.1)
PROT SERPL-MCNC: 8.5 G/DL (ref 6.4–8.2)
SODIUM SERPL-SCNC: 136 MMOL/L (ref 136–145)

## 2023-09-22 PROCEDURE — 3074F SYST BP LT 130 MM HG: CPT | Performed by: NURSE PRACTITIONER

## 2023-09-22 PROCEDURE — 99213 OFFICE O/P EST LOW 20 MIN: CPT | Performed by: NURSE PRACTITIONER

## 2023-09-22 PROCEDURE — 3079F DIAST BP 80-89 MM HG: CPT | Performed by: NURSE PRACTITIONER

## 2023-09-22 PROCEDURE — 80053 COMPREHEN METABOLIC PANEL: CPT | Performed by: NURSE PRACTITIONER

## 2023-09-22 PROCEDURE — 3008F BODY MASS INDEX DOCD: CPT | Performed by: NURSE PRACTITIONER

## 2023-09-22 RX ORDER — METOPROLOL SUCCINATE 50 MG/1
50 TABLET, EXTENDED RELEASE ORAL DAILY
Qty: 90 TABLET | Refills: 3 | Status: SHIPPED | OUTPATIENT
Start: 2023-09-22

## 2023-09-22 RX ORDER — AMLODIPINE BESYLATE 5 MG/1
5 TABLET ORAL DAILY
Qty: 90 TABLET | Refills: 3 | Status: SHIPPED | OUTPATIENT
Start: 2023-09-22

## 2023-09-22 RX ORDER — FLUOXETINE HYDROCHLORIDE 20 MG/1
20 CAPSULE ORAL DAILY
Qty: 90 CAPSULE | Refills: 3 | Status: SHIPPED | OUTPATIENT
Start: 2023-09-22

## 2023-09-24 PROBLEM — R74.8 ELEVATED LIVER ENZYMES: Status: ACTIVE | Noted: 2023-09-24

## 2023-09-25 ENCOUNTER — TELEPHONE (OUTPATIENT)
Dept: FAMILY MEDICINE CLINIC | Facility: CLINIC | Age: 38
End: 2023-09-25

## 2023-09-25 NOTE — TELEPHONE ENCOUNTER
----- Message from FLOR Eldridge sent at 9/24/2023  8:11 PM CDT -----  CMP - blood sugar, electrolytes, kidney function, and protein levels are normal.  Liver enzymes are elevated, may be due to recent use of alcohol, ibuprofen/acetaminophen, or meal with high fat content, advising recheck fasting in 3 months, we will add cholesterol panel as well - orders placed for 12/24/2023

## 2023-12-26 ENCOUNTER — TELEPHONE (OUTPATIENT)
Dept: FAMILY MEDICINE CLINIC | Facility: CLINIC | Age: 38
End: 2023-12-26

## 2023-12-26 NOTE — TELEPHONE ENCOUNTER
Letter mailed to patient reminding her she is due for labs per pt reminder.     Lab Frequency Next Occurrence   Hepatic Function Panel (7) [E] Once 12/24/2023   Lipid Panel [E] Once 12/24/2023     ALIS Lackey Mydhili Nurse  Due for 3 month repeat labs for [facility]

## 2024-01-23 ENCOUNTER — TELEPHONE (OUTPATIENT)
Dept: FAMILY MEDICINE CLINIC | Facility: CLINIC | Age: 39
End: 2024-01-23

## 2024-01-23 NOTE — TELEPHONE ENCOUNTER
Lab Frequency Next Occurrence   Hepatic Function Panel (7) [E] Once 12/24/2023   Lipid Panel [E] Once 12/24/2023     Letter mailed to patient reminding them they have outstanding orders.

## 2024-03-06 NOTE — TELEPHONE ENCOUNTER
From: Leah Garcia  To: Yady Cavazos  Sent: 3/5/2024 7:13 PM CST  Subject: Traveling    Hi! I’m traveling in 2 weeks (driving 15.5 hours nonstop to [place]). I get severe travel anticipation anxiety and do not handle driving in the mountains well. I have received some medication in the past to help with that. Can I please have something for that drive there and back? Thank you!

## 2024-03-06 NOTE — TELEPHONE ENCOUNTER
MCM sent, awaiting resposne      I recommend avoiding driving or hazardous while taking this medication as alprazolam can cause dizziness and drowsiness.

## 2024-03-07 ENCOUNTER — APPOINTMENT (OUTPATIENT)
Dept: GENERAL RADIOLOGY | Age: 39
End: 2024-03-07
Attending: NURSE PRACTITIONER
Payer: COMMERCIAL

## 2024-03-07 ENCOUNTER — HOSPITAL ENCOUNTER (OUTPATIENT)
Age: 39
Discharge: HOME | End: 2024-03-07
Payer: COMMERCIAL

## 2024-03-07 VITALS
BODY MASS INDEX: 28.25 KG/M2 | RESPIRATION RATE: 16 BRPM | HEART RATE: 67 BPM | HEIGHT: 67 IN | WEIGHT: 180 LBS | SYSTOLIC BLOOD PRESSURE: 135 MMHG | OXYGEN SATURATION: 100 % | DIASTOLIC BLOOD PRESSURE: 69 MMHG | TEMPERATURE: 98 F

## 2024-03-07 DIAGNOSIS — S81.811A LACERATION OF RIGHT LOWER EXTREMITY, INITIAL ENCOUNTER: Primary | ICD-10-CM

## 2024-03-07 PROCEDURE — 73590 X-RAY EXAM OF LOWER LEG: CPT | Performed by: NURSE PRACTITIONER

## 2024-03-07 NOTE — ED PROVIDER NOTES
Patient Seen in: [facility]      History     Chief Complaint   Patient presents with    Laceration     Stated Complaint: chin laceration x last night    Subjective:   HPI    39yr old female who is here with c/o a laceration to the right shin.  Sustained after walking into the corner of a table saw.  Tdap is up to date.     Objective:   Past Medical History:   Diagnosis Date    Anxiety 12/18/2010    Essential hypertension     worked up for secondary htn, negative; diagnosed at 23, runs strongly in mom's side    Hidradenitis suppurativa     hx recurrent inguinal boils s/p partial vulvectomy    Restless legs syndrome               Past Surgical History:   Procedure Laterality Date    ADDED FINGER SURGERY      COLONOSCOPY,BIOPSY  4/9/10    Performed by MARV CARLOS at [facility], [facility]                Social History     Socioeconomic History    Marital status:     Number of children: 0   Occupational History    Occupation: Massage therapist     Employer: [employer]   Tobacco Use    Smoking status: Former     Packs/day: 0.50     Years: 9.00     Additional pack years: 0.00     Total pack years: 4.50     Types: Cigarettes    Smokeless tobacco: Never    Tobacco comments:     quit 11/07   Vaping Use    Vaping Use: Every day    Substances: Nicotine, THC    Devices: Pre-filled or refillable cartridge   Substance and Sexual Activity    Alcohol use: Yes     Alcohol/week: 5.0 standard drinks of alcohol     Types: 5 Standard drinks or equivalent per week    Drug use: Yes     Types: Cannabis     Comment: weekends              Review of Systems    Positive for stated complaint: chin laceration x last night  Other systems are as noted in HPI.  Constitutional and vital signs reviewed.      All other systems reviewed and negative except as noted above.    Physical Exam     ED Triage Vitals [03/07/24 0927]   /69   Pulse 67   Resp 16   Temp 97.8 °F (36.6 °C)   Temp src Temporal   SpO2 100 %   O2  Device None (Room air)       Current:/69   Pulse 67   Temp 97.8 °F (36.6 °C) (Temporal)   Resp 16   Ht 170.2 cm (5' 7\")   Wt 81.6 kg   SpO2 100%   BMI 28.19 kg/m²         Physical Exam    VS: Vital signs reviewed. O2 saturation within normal limits for this patient     General: Patient is awake and alert, oriented to person, place and time. Not in acute distress.      HEENT: Head is normocephalic atraumatic. Pupils reactive bilaterally.  EOMs intact.     Lungs: No respiratory distress noted  Abdomen: Soft, nontender, nondistended.  Active bowel sounds present.         Extremities: No edema.  Pulses 2+ extremities.   Brisk capillary refill noted.      Skin: Normal skin turgor patient has a 2 cm superficial abrasion noted to the right shin.  There is no ecchymosis, no bleeding at this time.    CNS: Moves all 4 extremities.  Interacts appropriately.  No tremor.  No gait abnormality          ED Course   Labs Reviewed - No data to display          I have personally  reviewed available prior medical records for any recent pertinent discharge summaries/testing. Patient/family updated on results and plan, a verbalized understanding and agreement with the plan.  I explained to the patient that emergent conditions may arise and to go to the ER for new, worsening or any persistent conditions. I've explained the importance of taking all medicatons as prescribed, follow up, and return precuations,  All questions answered.    Please note that this report has been produced using speech recognition software and may contain errors related to that system including, but not limited to, errors in grammar, punctuation, and spelling, as well as words and phrases that possibly may have been recognized inappropriately.  If there are any questions or concerns, contact the dictating provider for clarification.         MDM   Patient is here with a 2 cm laceration that is very superficial/abrasion noted to the right shin.   Steri-Strips applied after wound care was completed.  An x-ray was completed which shows no fractures or dislocations per my independent interpretation of the x-ray films.  40 patient is up-to-date on tetanus.  I did discuss return and follow-up precautions.  Patient discharged home with follow-up with his primary care physician and to return with any worsening symptoms or concerns                                   Medical Decision Making      Disposition and Plan     Clinical Impression:  1. Laceration of right lower extremity, initial encounter         Disposition:  Discharge  3/7/2024 10:36 am    Follow-up:  Seamus Samuels MD  [address]  [facility] [address]  [phone]                Medications Prescribed:  Discharge Medication List as of 3/7/2024 10:37 AM

## 2024-03-07 NOTE — DISCHARGE INSTRUCTIONS
Laceration:     You may take ibuprofen 600 mg every 6 hours as needed for pain.  You may take Tylenol 1000 mg every 6 hours as needed for pain.  Keep wound clean and dry.  The steri strips will start to fall off in about 5-7 days..  Follow with your primary care physician.  Return to the emergency room if you develop drainage from the wound, worsening redness pain or swelling at the site which extends up the extremity, fever over 103 or other new or worsened symptoms.

## 2024-05-01 PROCEDURE — 86376 MICROSOMAL ANTIBODY EACH: CPT | Performed by: NURSE PRACTITIONER

## 2024-05-01 PROCEDURE — 85025 COMPLETE CBC W/AUTO DIFF WBC: CPT | Performed by: NURSE PRACTITIONER

## 2024-05-01 PROCEDURE — 83540 ASSAY OF IRON: CPT | Performed by: NURSE PRACTITIONER

## 2024-05-01 PROCEDURE — 84443 ASSAY THYROID STIM HORMONE: CPT | Performed by: NURSE PRACTITIONER

## 2024-05-01 PROCEDURE — 82728 ASSAY OF FERRITIN: CPT | Performed by: NURSE PRACTITIONER

## 2024-05-01 PROCEDURE — 84439 ASSAY OF FREE THYROXINE: CPT | Performed by: NURSE PRACTITIONER

## 2024-05-01 PROCEDURE — 83550 IRON BINDING TEST: CPT | Performed by: NURSE PRACTITIONER

## 2024-05-06 ENCOUNTER — TELEPHONE (OUTPATIENT)
Dept: FAMILY MEDICINE CLINIC | Facility: CLINIC | Age: 39
End: 2024-05-06

## 2024-05-06 NOTE — TELEPHONE ENCOUNTER
Pt has not seen Mychart from 5/3/24    Your results are all normal.  These results suggest that you do not have a thyroid disease or iron deficiency.  Lets increase fluoxetine from 20mg daily to 30mg daily.  Fluoxetine does not come in a 30mg capsule, so this requires you to take 20mg and 10mg for a total of 30mg.  I will send both doses to [facility] in [place].     Please let me know if you have any questions.  Lets plan to follow-up in 4 weeks - video visit is okay if it is more convenient for you.

## 2024-05-26 DIAGNOSIS — F41.9 ANXIETY: ICD-10-CM

## 2024-05-28 RX ORDER — FLUOXETINE 10 MG/1
10 CAPSULE ORAL DAILY
Qty: 90 CAPSULE | Refills: 0 | Status: SHIPPED | OUTPATIENT
Start: 2024-05-28

## 2024-06-17 DIAGNOSIS — F41.9 ANXIETY: ICD-10-CM

## 2024-06-18 RX ORDER — CLONAZEPAM 0.5 MG/1
0.5 TABLET ORAL DAILY PRN
Qty: 30 TABLET | Refills: 0 | Status: SHIPPED | OUTPATIENT
Start: 2024-06-18

## 2024-06-18 NOTE — TELEPHONE ENCOUNTER
No refill protocol for this medication.    Last refill: 5/01/2024 #30 with 0 refills  Last Visit: 5/01/2024  Next Visit: No future appointments.      Forward to FLOR Howard please advise on refills. Thanks.

## 2024-08-12 DIAGNOSIS — I10 ESSENTIAL HYPERTENSION: ICD-10-CM

## 2024-08-12 DIAGNOSIS — F41.9 ANXIETY: ICD-10-CM

## 2024-08-12 NOTE — TELEPHONE ENCOUNTER
Pt was told she has to have all of her meds transferred to new pharmacy ([facility] in [place] on [address]). [facility] refills so she got that one refilled. Needs refills on all other meds:    CLONAZEPAM 0.5 MG Oral Tab [208541] (Order [id number]   FLUOXETINE 10 MG Oral Cap [208945] (Order [id number])   FLUoxetine 20 MG Oral Cap [208946] (Order [id number])   amLODIPine 5 MG Oral Tab [208045] (Order [id number])     New pharmacy:    [id number] - [place], [place] - [address] [facility] [address], [phone], [phone]   [facility] [address]   Phone: [phone] Fax: [phone]       Thank you!

## 2024-08-13 ENCOUNTER — TELEPHONE (OUTPATIENT)
Dept: FAMILY MEDICINE CLINIC | Facility: CLINIC | Age: 39
End: 2024-08-13

## 2024-08-13 RX ORDER — FLUOXETINE 10 MG/1
10 CAPSULE ORAL DAILY
Qty: 30 CAPSULE | Refills: 0 | Status: SHIPPED | OUTPATIENT
Start: 2024-08-13

## 2024-08-13 RX ORDER — AMLODIPINE BESYLATE 5 MG/1
5 TABLET ORAL DAILY
Qty: 30 TABLET | Refills: 0 | Status: SHIPPED | OUTPATIENT
Start: 2024-08-13

## 2024-08-13 RX ORDER — FLUOXETINE HYDROCHLORIDE 20 MG/1
20 CAPSULE ORAL DAILY
Qty: 30 CAPSULE | Refills: 0 | Status: SHIPPED | OUTPATIENT
Start: 2024-08-13

## 2024-08-13 RX ORDER — CLONAZEPAM 0.5 MG/1
0.5 TABLET ORAL DAILY PRN
Qty: 30 TABLET | Refills: 0 | Status: SHIPPED | OUTPATIENT
Start: 2024-08-13

## 2024-08-13 NOTE — TELEPHONE ENCOUNTER
Patient notified and verbalized understanding.   Sees Yady RAY,   Yearly appt with collaborating physician scheduled.   Future Appointments   Date Time Provider Department Center   9/5/2024  8:40 AM Kasey Holloway MD [facility] [place]

## 2024-08-22 DIAGNOSIS — F41.9 ANXIETY: ICD-10-CM

## 2024-08-22 NOTE — TELEPHONE ENCOUNTER
Psychiatric Non-Scheduled (Anti-Anxiety) Yeffjn1808/22/2024 12:31 AM   Protocol Details In person appointment or virtual visit in the past 6 mos or appointment in next 3 mos    Depression Screening completed within the past 12 months      Refilled per protocol  FLUoxetine 20 MG Oral Cap   Last refilled on 8/13/24 #30 with 0 rf.  LOV- 5/1/24  Last labs- 5/1/24    Sent to pharmacy

## 2024-09-15 DIAGNOSIS — R00.0 RAPID RESTING HEART RATE: ICD-10-CM

## 2024-09-15 DIAGNOSIS — I10 ESSENTIAL HYPERTENSION: ICD-10-CM

## 2024-09-15 DIAGNOSIS — F41.9 ANXIETY: ICD-10-CM

## 2024-09-16 DIAGNOSIS — R00.0 RAPID RESTING HEART RATE: ICD-10-CM

## 2024-09-16 DIAGNOSIS — I10 ESSENTIAL HYPERTENSION: ICD-10-CM

## 2024-09-16 DIAGNOSIS — F41.9 ANXIETY: ICD-10-CM

## 2024-09-16 RX ORDER — CLONAZEPAM 0.5 MG/1
0.5 TABLET ORAL
Qty: 30 TABLET | Refills: 0 | Status: SHIPPED | OUTPATIENT
Start: 2024-09-16

## 2024-09-16 RX ORDER — METOPROLOL SUCCINATE 50 MG/1
50 TABLET, EXTENDED RELEASE ORAL DAILY
Qty: 60 TABLET | Refills: 0 | Status: SHIPPED | OUTPATIENT
Start: 2024-09-16

## 2024-09-16 NOTE — TELEPHONE ENCOUNTER
Last refill: 9/22/2023 #90 with 3 refills  Last Visit: 5/01/2024 with FLOR Howard  Next Visit:   Future Appointments   Date Time Provider Department Center   9/18/2024  8:40 AM Kasey Holloway MD [facility] [place]         Forward to Dr. Lucio please advise on refills. Thanks.  Hypertension Medications Protocol Joprjd97/15/2024 01:35 PM   Protocol Details CMP or BMP in past 12 months    Last BP reading less than 140/90    In person appointment or virtual visit in the past 12 mos or appointment in next 3 mos    EGFRCR or GFRNAA > 50

## 2024-09-16 NOTE — TELEPHONE ENCOUNTER
Pt failed refill protocol for the following reasons:    Requested Renewals     Name from pharmacy: CLONAZEPAM 0.5MG TABLETS         Will file in chart as: CLONAZEPAM 0.5 MG Oral Tab    Sig: TAKE 1 TABLET(0.5 MG) BY MOUTH DAILY AS NEEDED FOR ANXIETY    Disp: 30 tablet    Refills: 0 (Pharmacy requested: Not specified)    Start: 9/15/2024    Class: Normal    Non-formulary For: Anxiety    Last ordered: 1 month ago (8/13/2024) by Kasey Holloway MD    Last refill: 8/13/2024    Rx #: [id number]    Controlled Substance Medication Hoqgnw19/15/2024 01:36 PM    This medication is a controlled substance - forward to provider to refill      To be filled at: [id number] [address] [place] [facility] [address], [phone], [phone]          Last refill: 8/13/24  Last appt: 5/1/24  Next appt:   Future Appointments   Date Time Provider Department Center   9/18/2024  8:40 AM Kasey Holloway MD [facility] [place]         Forward to Dr. Lucio, please advise on refills. Thank you.

## 2024-09-16 NOTE — TELEPHONE ENCOUNTER
Future Appointments   Date Time Provider Department Center   9/18/2024  8:40 AM Kasey Holloway MD [facility] [place]

## 2024-09-17 RX ORDER — METOPROLOL SUCCINATE 50 MG/1
50 TABLET, EXTENDED RELEASE ORAL DAILY
Qty: 90 TABLET | Refills: 0 | OUTPATIENT
Start: 2024-09-17

## 2024-09-17 NOTE — TELEPHONE ENCOUNTER
E-Prescribing Status: Receipt confirmed by pharmacy (9/16/2024  4:01 PM CDT) ; duplicate rx refill request, request denied.

## 2024-09-18 ENCOUNTER — OFFICE VISIT (OUTPATIENT)
Dept: FAMILY MEDICINE CLINIC | Facility: CLINIC | Age: 39
End: 2024-09-18
Payer: COMMERCIAL

## 2024-09-18 VITALS
DIASTOLIC BLOOD PRESSURE: 82 MMHG | SYSTOLIC BLOOD PRESSURE: 114 MMHG | WEIGHT: 188 LBS | HEIGHT: 67 IN | RESPIRATION RATE: 18 BRPM | BODY MASS INDEX: 29.51 KG/M2 | TEMPERATURE: 97 F | OXYGEN SATURATION: 99 % | HEART RATE: 88 BPM

## 2024-09-18 DIAGNOSIS — Z71.6 ENCOUNTER FOR TOBACCO USE CESSATION COUNSELING: ICD-10-CM

## 2024-09-18 DIAGNOSIS — Z00.00 WELL ADULT EXAM: Primary | ICD-10-CM

## 2024-09-18 DIAGNOSIS — F41.9 ANXIETY: ICD-10-CM

## 2024-09-18 PROBLEM — R42 DIZZINESS: Status: RESOLVED | Noted: 2021-09-07 | Resolved: 2024-09-18

## 2024-09-18 PROBLEM — R06.02 SHORTNESS OF BREATH: Status: RESOLVED | Noted: 2021-09-07 | Resolved: 2024-09-18

## 2024-09-18 PROBLEM — R07.9 CHEST PAIN, UNSPECIFIED: Status: RESOLVED | Noted: 2021-09-07 | Resolved: 2024-09-18

## 2024-09-18 LAB
ALBUMIN SERPL-MCNC: 4.2 G/DL (ref 3.2–4.8)
ALBUMIN/GLOB SERPL: 1.2 {RATIO} (ref 1–2)
ALP LIVER SERPL-CCNC: 41 U/L
ALT SERPL-CCNC: 27 U/L
ANION GAP SERPL CALC-SCNC: 10 MMOL/L (ref 0–18)
AST SERPL-CCNC: 25 U/L (ref ?–34)
BASOPHILS # BLD AUTO: 0.04 X10(3) UL (ref 0–0.2)
BASOPHILS NFR BLD AUTO: 0.8 %
BILIRUB SERPL-MCNC: 0.6 MG/DL (ref 0.3–1.2)
BUN BLD-MCNC: 11 MG/DL (ref 9–23)
CALCIUM BLD-MCNC: 10 MG/DL (ref 8.7–10.4)
CHLORIDE SERPL-SCNC: 104 MMOL/L (ref 98–112)
CHOLEST SERPL-MCNC: 231 MG/DL (ref ?–200)
CO2 SERPL-SCNC: 23 MMOL/L (ref 21–32)
CREAT BLD-MCNC: 0.77 MG/DL
EGFRCR SERPLBLD CKD-EPI 2021: 102 ML/MIN/1.73M2 (ref 60–?)
EOSINOPHIL # BLD AUTO: 0.13 X10(3) UL (ref 0–0.7)
EOSINOPHIL NFR BLD AUTO: 2.7 %
ERYTHROCYTE [DISTWIDTH] IN BLOOD BY AUTOMATED COUNT: 12.3 %
FASTING PATIENT LIPID ANSWER: YES
FASTING STATUS PATIENT QL REPORTED: YES
GLOBULIN PLAS-MCNC: 3.5 G/DL (ref 2–3.5)
GLUCOSE BLD-MCNC: 99 MG/DL (ref 70–99)
HCT VFR BLD AUTO: 44.2 %
HDLC SERPL-MCNC: 60 MG/DL (ref 40–59)
HGB BLD-MCNC: 15.2 G/DL
IMM GRANULOCYTES # BLD AUTO: 0.01 X10(3) UL (ref 0–1)
IMM GRANULOCYTES NFR BLD: 0.2 %
LDLC SERPL CALC-MCNC: 157 MG/DL (ref ?–100)
LYMPHOCYTES # BLD AUTO: 1.32 X10(3) UL (ref 1–4)
LYMPHOCYTES NFR BLD AUTO: 27.3 %
MCH RBC QN AUTO: 33.2 PG (ref 26–34)
MCHC RBC AUTO-ENTMCNC: 34.4 G/DL (ref 31–37)
MCV RBC AUTO: 96.5 FL
MONOCYTES # BLD AUTO: 0.36 X10(3) UL (ref 0.1–1)
MONOCYTES NFR BLD AUTO: 7.4 %
NEUTROPHILS # BLD AUTO: 2.98 X10 (3) UL (ref 1.5–7.7)
NEUTROPHILS # BLD AUTO: 2.98 X10(3) UL (ref 1.5–7.7)
NEUTROPHILS NFR BLD AUTO: 61.6 %
NONHDLC SERPL-MCNC: 171 MG/DL (ref ?–130)
OSMOLALITY SERPL CALC.SUM OF ELEC: 283 MOSM/KG (ref 275–295)
PLATELET # BLD AUTO: 277 10(3)UL (ref 150–450)
POTASSIUM SERPL-SCNC: 4.4 MMOL/L (ref 3.5–5.1)
PROT SERPL-MCNC: 7.7 G/DL (ref 5.7–8.2)
RBC # BLD AUTO: 4.58 X10(6)UL
SODIUM SERPL-SCNC: 137 MMOL/L (ref 136–145)
TRIGL SERPL-MCNC: 81 MG/DL (ref 30–149)
TSI SER-ACNC: 1.85 MIU/ML (ref 0.55–4.78)
VLDLC SERPL CALC-MCNC: 16 MG/DL (ref 0–30)
WBC # BLD AUTO: 4.8 X10(3) UL (ref 4–11)

## 2024-09-18 PROCEDURE — 80061 LIPID PANEL: CPT | Performed by: FAMILY MEDICINE

## 2024-09-18 PROCEDURE — 80050 GENERAL HEALTH PANEL: CPT | Performed by: FAMILY MEDICINE

## 2024-09-18 RX ORDER — FLUOXETINE 10 MG/1
TABLET, FILM COATED ORAL
Qty: 90 TABLET | Refills: 0 | Status: SHIPPED | OUTPATIENT
Start: 2024-09-18

## 2024-09-18 RX ORDER — FLUOXETINE 10 MG/1
10 CAPSULE ORAL DAILY
Qty: 30 CAPSULE | Refills: 0 | Status: CANCELLED | OUTPATIENT
Start: 2024-09-18

## 2024-09-18 RX ORDER — BUPROPION HYDROCHLORIDE 150 MG/1
150 TABLET, EXTENDED RELEASE ORAL 2 TIMES DAILY
Qty: 60 TABLET | Refills: 0 | Status: SHIPPED | OUTPATIENT
Start: 2024-09-18

## 2024-09-18 NOTE — PROGRESS NOTES
Chief Complaint   Patient presents with    Other     Establish care w/ PCP    Well Adult     Pt is fasting         HPI  Pt is here for wellness and is having worsening anxiety. She works for [employer]. Between kids  and work pt often feels overwhelmed. Tobacco cessation discussed and pt will consider wellbutrin before increasing fluoxetine    ROS  As per HPI and all other systems reviewed and are negative      Past Medical History:    Anxiety    Chest pain, unspecified    Dizziness    Essential hypertension    worked up for secondary htn, negative; diagnosed at 23, runs strongly in mom's side    Hidradenitis suppurativa    hx recurrent inguinal boils s/p partial vulvectomy    Restless legs syndrome    Shortness of breath       Past Surgical History:   Procedure Laterality Date    Added finger surgery      Colonoscopy,biopsy  4/9/10    Performed by MARV CARLOS at [facility], [facility]       Social History     Socioeconomic History    Marital status:     Number of children: 0   Occupational History    Occupation: Massage therapist     Employer: [employer]   Tobacco Use    Smoking status: Former     Current packs/day: 0.50     Average packs/day: 0.5 packs/day for 9.0 years (4.5 ttl pk-yrs)     Types: Cigarettes     Passive exposure: Past    Smokeless tobacco: Never    Tobacco comments:     quit 11/07   Vaping Use    Vaping status: Every Day    Substances: Nicotine, THC    Devices: Pre-filled or refillable cartridge   Substance and Sexual Activity    Alcohol use: Yes     Alcohol/week: 5.0 standard drinks of alcohol     Types: 5 Standard drinks or equivalent per week    Drug use: Yes     Types: Cannabis     Comment: weekends       Family History   Problem Relation Age of Onset    Hypertension Mother     Cancer Mother         hx melanoma    Hypertension Maternal Grandmother     Colon Cancer Maternal Grandfather 80    Hypertension Maternal Grandfather         Current  Outpatient Medications on File Prior to Visit   Medication Sig Dispense Refill    METOPROLOL SUCCINATE ER 50 MG Oral Tablet 24 Hr TAKE 1 TABLET BY MOUTH EVERY DAY 60 tablet 0    CLONAZEPAM 0.5 MG Oral Tab TAKE 1 TABLET(0.5 MG) BY MOUTH DAILY AS NEEDED FOR ANXIETY 30 tablet 0    FLUOXETINE 20 MG Oral Cap TAKE 1 CAPSULE (20 MG TOTAL) BY MOUTH DAILY WITH 10MG TO TOTAL 30MG DAILY 90 capsule 0    amLODIPine 5 MG Oral Tab Take 1 tablet (5 mg total) by mouth daily. 30 tablet 0     No current facility-administered medications on file prior to visit.         Objective  Vitals:    09/18/24 0844   BP: 114/82   Pulse: 88   Resp: 18   Temp: 97.1 °F (36.2 °C)   SpO2: 99%   Weight: 188 lb (85.3 kg)   Height: 5' 7\" (1.702 m)     Physical Exam  Constitutional:       Appearance: Normal appearance.   HEENT:      Head: Normocephalic and atraumatic.      Eyes: PERRLA no notable nystagmus     Ears: normal on observation     Nose: Nose normal.      Mouth: Mucous membranes are moist.      Neck: no masses no bruit  Cardiovascular:      Rate and Rhythm: Normal rate and regular rhythm.   Pulmonary:      Effort: Pulmonary effort is normal.      Breath sounds: Normal breath sounds.   Abdominal:      General: Bowel sounds are normal.      Palpations: Abdomen is soft. There is no mass.   Musculoskeletal:         General: Normal range of motion.      Cervical back: Normal range of motion.   Skin:     General: Skin is warm and dry.   Neurological:      General: No focal deficit present.      Mental Status: She is alert and oriented to person, place, and time.   Psychiatric:         Mood and Affect: Mood normal.         Thought Content: Thought content normal.       Assessment and Plan  Leah was seen today for other and well adult.    Diagnoses and all orders for this visit:    Well adult exam  -     CBC W Differential W Platelet [E]; Future  -     Comp Metabolic Panel (14) [E]; Future  -     Lipid Panel [E]; Future  -     TSH W Reflex To Free T4  [E]; Future  -     CBC W Differential W Platelet [E]  -     Comp Metabolic Panel (14) [E]  -     Lipid Panel [E]  -     TSH W Reflex To Free T4 [E]    Anxiety  -     Psychiatry Referral - In Network  -     FLUoxetine 10 MG Oral Tab; Take with 20mg tablet for daily dose of 30mg  -     buPROPion  MG Oral Tablet 12 Hr; Take 1 tablet (150 mg total) by mouth 2 (two) times daily.    Encounter for tobacco use cessation counseling  -     Smoking Cessation 3-10 minutes (48302)  -     buPROPion  MG Oral Tablet 12 Hr; Take 1 tablet (150 mg total) by mouth 2 (two) times daily.     Medication side effects and benefits discussed with patient.  Patient advised to read the drug insert for any further information and call with questions        Follow up  Return in about 3 weeks (around 10/9/2024).      Patient Instructions  There are no Patient Instructions on file for this visit.       Kasey Holloway MD       This note was created by [employer] voice recognition. Errors in content may be related to improper recognition by the system; efforts to review and correct have been done but errors may still exist. Please be advised the primary purpose of this note is for me to communicate medical care. Standard sentence structure is not always used. Medical terminology and medical abbreviations may be used. There may be grammatical, typographical, and automated fill ins that may have errors missed in proofreading.

## 2024-11-14 DIAGNOSIS — R00.0 RAPID RESTING HEART RATE: ICD-10-CM

## 2024-11-14 DIAGNOSIS — I10 ESSENTIAL HYPERTENSION: ICD-10-CM

## 2024-11-14 DIAGNOSIS — F41.9 ANXIETY: ICD-10-CM

## 2024-11-14 RX ORDER — AMLODIPINE BESYLATE 5 MG/1
5 TABLET ORAL DAILY
Qty: 90 TABLET | Refills: 0 | Status: SHIPPED | OUTPATIENT
Start: 2024-11-14

## 2024-11-14 RX ORDER — METOPROLOL SUCCINATE 50 MG/1
50 TABLET, EXTENDED RELEASE ORAL DAILY
Qty: 60 TABLET | Refills: 0 | Status: SHIPPED | OUTPATIENT
Start: 2024-11-14 | End: 2024-11-14

## 2024-11-14 RX ORDER — METOPROLOL SUCCINATE 50 MG/1
50 TABLET, EXTENDED RELEASE ORAL DAILY
Qty: 90 TABLET | Refills: 0 | Status: SHIPPED | OUTPATIENT
Start: 2024-11-14

## 2024-11-14 RX ORDER — AMLODIPINE BESYLATE 5 MG/1
5 TABLET ORAL DAILY
Qty: 30 TABLET | Refills: 0 | Status: SHIPPED | OUTPATIENT
Start: 2024-11-14 | End: 2024-11-14

## 2024-11-14 NOTE — TELEPHONE ENCOUNTER
Hypertension Medications Protocol Ehzkid9011/14/2024 07:44 AM   Protocol Details CMP or BMP in past 12 months    Last BP reading less than 140/90    In person appointment or virtual visit in the past 12 mos or appointment in next 3 mos    EGFRCR or GFRNAA > 50

## 2024-11-14 NOTE — TELEPHONE ENCOUNTER
E-Prescribing Status: Receipt confirmed by pharmacy (11/14/2024  9:06 AM CST; Duplicate rx refill request; request denied.

## 2024-11-14 NOTE — TELEPHONE ENCOUNTER
Hypertension Medications Protocol Vexfrq2011/14/2024 09:06 AM   Protocol Details CMP or BMP in past 12 months    Last BP reading less than 140/90    In person appointment or virtual visit in the past 12 mos or appointment in next 3 mos    EGFRCR or GFRNAA > 50      Patient requesting 90-day supply.

## 2024-11-27 ENCOUNTER — PATIENT MESSAGE (OUTPATIENT)
Dept: FAMILY MEDICINE CLINIC | Facility: CLINIC | Age: 39
End: 2024-11-27

## 2024-11-27 DIAGNOSIS — R74.8 ELEVATED LIVER ENZYMES: Primary | ICD-10-CM

## 2024-11-27 DIAGNOSIS — K62.5 RECTAL BLEEDING: ICD-10-CM

## 2024-11-29 NOTE — TELEPHONE ENCOUNTER
Left message to voicemail (per verbal release form consent, confirmed with identifying message.)  Advised patient to call office back [phone] - need to triage.    Please see patient's MCM  Please advise, thank you

## 2024-11-29 NOTE — TELEPHONE ENCOUNTER
Per Dr. Lucio: If patient is losing this much blood on a consistent basis she will need to go to the emergency room for rectal bleeding due to risk of excessive blood loss.  However if it is only once in a while then I can do an outpatient referral back to Dr. Caldwell.    In the meantime she is to take stool softener 3 times a day and MiraLAX every evening with 64 ounces of water throughout the day to see if this helps.       MCM sent to patient  Notify me if not read by 11/30/24

## 2024-12-05 NOTE — TELEPHONE ENCOUNTER
MCM not read    Left message to voicemail (per verbal release form consent, confirmed with identifying message.)  Advised patient to call office back [phone] - need to discuss notes below, condition update.

## 2024-12-11 DIAGNOSIS — F41.9 ANXIETY: ICD-10-CM

## 2024-12-11 NOTE — TELEPHONE ENCOUNTER
Psychiatric Non-Scheduled (Anti-Anxiety) Dxhkuh7612/11/2024 12:43 AM   Protocol Details In person appointment or virtual visit in the past 6 mos or appointment in next 3 mos    Depression Screening completed within the past 12 months

## 2024-12-12 DIAGNOSIS — Z71.6 ENCOUNTER FOR TOBACCO USE CESSATION COUNSELING: ICD-10-CM

## 2024-12-12 DIAGNOSIS — F41.9 ANXIETY: ICD-10-CM

## 2024-12-12 NOTE — TELEPHONE ENCOUNTER
Patient states she would like a referral out to a different doctor, states did not have a positive experience with Dr. Caldwell. Patient verbalized understanding regarding stool softener 3x daily with MiraLAX every evening with 64 ounces of water throughout the day.     Patient states it is not extremely frequent however it has been happening for a long course of time, several years. Patient states she has no further concerns would just like the referral. Endorsed to RN.

## 2024-12-13 RX ORDER — BUPROPION HYDROCHLORIDE 150 MG/1
150 TABLET, EXTENDED RELEASE ORAL 2 TIMES DAILY
Qty: 60 TABLET | Refills: 0 | Status: SHIPPED | OUTPATIENT
Start: 2024-12-13

## 2024-12-13 RX ORDER — CLONAZEPAM 0.5 MG/1
0.5 TABLET ORAL
Qty: 30 TABLET | Refills: 0 | Status: SHIPPED | OUTPATIENT
Start: 2024-12-13

## 2024-12-13 NOTE — TELEPHONE ENCOUNTER
BUPROPION SR 150MG TABLETS (12 H)   Last refill:9/18/2024, for #60, 0 refill    Psychiatric Non-Scheduled (Anti-Anxiety) Nwqyga8612/12/2024 02:04 PM   Protocol Details In person appointment or virtual visit in the past 6 mos or appointment in next 3 mos    Depression Screening completed within the past 12 months        CLONAZEPAM 0.5MG TABLETS   Pt failed refill protocol for the following reasons:    Controlled Substance Medication Ixcrti8012/12/2024 02:04 PM    This medication is a controlled substance - forward to provider to refill      Last refill: 9/16/2024, for #30, 0 refill  Last Px:9/18/2024  Last appt: 9/18/2024  Next appt: N/A    Forward to Dr. Sherlyn Holloway, please advise on refills. Thank you.

## 2024-12-13 NOTE — TELEPHONE ENCOUNTER
Please let patient know that I done a referral to a general surgeon as they are better able to treat hemorrhoids.  If she has any questions please let me know

## 2024-12-17 DIAGNOSIS — F41.9 ANXIETY: ICD-10-CM

## 2024-12-17 RX ORDER — FLUOXETINE 10 MG/1
TABLET, FILM COATED ORAL
Qty: 90 TABLET | Refills: 0 | OUTPATIENT
Start: 2024-12-17

## 2024-12-17 RX ORDER — FLUOXETINE 10 MG/1
TABLET, FILM COATED ORAL
Qty: 90 TABLET | Refills: 0 | Status: SHIPPED | OUTPATIENT
Start: 2024-12-17 | End: 2024-12-17

## 2024-12-17 RX ORDER — FLUOXETINE 10 MG/1
TABLET, FILM COATED ORAL
Qty: 90 TABLET | Refills: 0 | Status: SHIPPED | OUTPATIENT
Start: 2024-12-17

## 2024-12-17 NOTE — TELEPHONE ENCOUNTER
[id number] - [place], [place] - [address] [phone], [phone]   [address] [facility] [address]   Phone: [phone] Fax: [phone]     PATIENT CALLING THIS MORNING.  SHE SAYS SHE IS ON BOTH     FLUOXETINE 20 MG Oral Cap 90 capsule 0 12/11/2024 --    Sig: TAKE 1 CAPSULE (20 MG TOTAL) BY MOUTH DAILY WITH 10MG TO TOTAL 30MG DAILY    Sent to pharmacy as: FLUoxetine HCl 20 MG Oral Capsule (PROzac)             FLUoxetine 10 MG Oral Tab 90 tablet 0 9/18/2024 --   Sig:   Take with 20mg tablet for daily dose of 30mg       PATIENT NEEDS TO BE ON 30MG TOTAL.  FLUOXETINE DOES NOT COME IN 30MG.  PATIENT STATES SHE RECEIVED A MESSAGE INFORMING HER THERE WAS A DUPLICATE REQUEST FOR THE FLUOXETINE

## 2024-12-17 NOTE — TELEPHONE ENCOUNTER
LOV/well adult 09/18/24  Last labs 09/18/24  Last refill on 09/18/24, for #90 tabs, with 0 refills  FLUoxetine 10 MG Oral Tab   Psychiatric Non-Scheduled (Anti-Anxiety) Zdrkji9912/17/2024 12:00 PM   Protocol Details In person appointment or virtual visit in the past 6 mos or appointment in next 3 mos    Depression Screening completed within the past 12 months     No future appointments.    Order per protocol  Rx sent to [facility]     Left detailed message to voicemail (per verbal release form consent with confirmed identifying message) of note below, above. Patient was advised to call office back with any questions/concerns.    Rx resent to [facility] per patient request    Called [facility] pharmacy ph#[phone]:  confirmed Rx sent today cancelled

## 2025-02-16 DIAGNOSIS — I10 ESSENTIAL HYPERTENSION: ICD-10-CM

## 2025-02-16 DIAGNOSIS — F41.9 ANXIETY: ICD-10-CM

## 2025-02-17 NOTE — TELEPHONE ENCOUNTER
Hypertension Medications Protocol Qaxxwf2202/16/2025 09:57 AM   Protocol Details CMP or BMP in past 12 months    Last BP reading less than 140/90    In person appointment or virtual visit in the past 12 mos or appointment in next 3 mos    EGFRCR or GFRNAA > 50    Medication is active on med list         LOV/well adult 09/18/24  Last labs 09/18/24  Last refill on 12/13/24, for #30 tabs, with 0 refills  CLONAZEPAM 0.5 MG Oral Tab     Controlled Substance Medication Kshfgp4102/16/2025 09:57 AM    This medication is a controlled substance - forward to provider to refill    Medication is active on med list        No future appointments.    Order(s) pending, please review. Thank you.

## 2025-02-18 RX ORDER — CLONAZEPAM 0.5 MG/1
0.5 TABLET ORAL
Qty: 30 TABLET | Refills: 0 | Status: SHIPPED | OUTPATIENT
Start: 2025-02-18

## 2025-02-18 RX ORDER — AMLODIPINE BESYLATE 5 MG/1
5 TABLET ORAL DAILY
Qty: 90 TABLET | Refills: 0 | Status: SHIPPED | OUTPATIENT
Start: 2025-02-18

## 2025-03-09 DIAGNOSIS — F41.9 ANXIETY: ICD-10-CM

## 2025-03-10 NOTE — TELEPHONE ENCOUNTER
Psychiatric Non-Scheduled (Anti-Anxiety) Ggktcn5203/09/2025 06:53 AM   Protocol Details In person appointment or virtual visit in the past 6 mos or appointment in next 3 mos    Depression Screening completed within the past 12 months    Medication is active on med list       LRF 12/17/24 #90  LOV 9/18/24

## 2025-04-14 DIAGNOSIS — I10 ESSENTIAL HYPERTENSION: ICD-10-CM

## 2025-04-14 DIAGNOSIS — F41.9 ANXIETY: ICD-10-CM

## 2025-04-14 DIAGNOSIS — R00.0 RAPID RESTING HEART RATE: ICD-10-CM

## 2025-04-14 RX ORDER — METOPROLOL SUCCINATE 50 MG/1
50 TABLET, EXTENDED RELEASE ORAL DAILY
Qty: 90 TABLET | Refills: 0 | Status: SHIPPED | OUTPATIENT
Start: 2025-04-14

## 2025-04-14 NOTE — TELEPHONE ENCOUNTER
METOPROLOL ER SUCCINATE 50MG TABS     Hypertension Medications Protocol Txsajb7404/14/2025 03:33 PM   Protocol Details CMP or BMP in past 12 months    Last BP reading less than 140/90    In person appointment or virtual visit in the past 12 mos or appointment in next 3 mos    EGFRCR or GFRNAA > 50    Medication is active on med list      LOV 9/18/2024  Last labs Comp, 9/18/2024  Last refill on 11/14/2024, for #90, with 0 refills    No future appointments.

## 2025-05-15 DIAGNOSIS — I10 ESSENTIAL HYPERTENSION: ICD-10-CM

## 2025-05-16 RX ORDER — AMLODIPINE BESYLATE 5 MG/1
5 TABLET ORAL DAILY
Qty: 90 TABLET | Refills: 0 | Status: SHIPPED | OUTPATIENT
Start: 2025-05-16

## 2025-05-16 NOTE — TELEPHONE ENCOUNTER
Requested Renewals     Name from pharmacy: AMLODIPINE BESYLATE 5MG TABLETS         Will file in chart as: AMLODIPINE 5 MG Oral Tab    Sig: TAKE 1 TABLET(5 MG) BY MOUTH DAILY    Disp: 90 tablet    Refills: 0 (Pharmacy requested: Not specified)    Start: 5/15/2025    Class: Normal    Non-formulary For: Essential hypertension    Last ordered: 2 months ago (2/18/2025) by Kasey Holloway MD    Last refill: 4/14/2025    Rx #: [id number]    Hypertension Medications Protocol Stcwwr32/15/2025 04:25 PM   Protocol Details CMP or BMP in past 12 months    Last BP reading less than 140/90    In person appointment or virtual visit in the past 12 mos or appointment in next 3 mos    EGFRCR or GFRNAA > 50    Medication is active on med list      [id number] - [place], [place] - [address] AT [facility] [address], [phone], [phone]

## 2025-05-31 ENCOUNTER — HOSPITAL ENCOUNTER (OUTPATIENT)
Age: 40
Discharge: HOME | End: 2025-05-31
Payer: COMMERCIAL

## 2025-05-31 VITALS
BODY MASS INDEX: 29.82 KG/M2 | HEIGHT: 67 IN | SYSTOLIC BLOOD PRESSURE: 126 MMHG | OXYGEN SATURATION: 97 % | RESPIRATION RATE: 20 BRPM | HEART RATE: 92 BPM | DIASTOLIC BLOOD PRESSURE: 78 MMHG | WEIGHT: 190 LBS | TEMPERATURE: 98 F

## 2025-05-31 DIAGNOSIS — J02.9 SORE THROAT: Primary | ICD-10-CM

## 2025-05-31 DIAGNOSIS — F41.9 ANXIETY: ICD-10-CM

## 2025-05-31 PROCEDURE — 99203 OFFICE O/P NEW LOW 30 MIN: CPT | Performed by: PHYSICIAN ASSISTANT

## 2025-05-31 NOTE — DISCHARGE INSTRUCTIONS
Take the antibiotic as prescribed.  Take Tylenol Motrin for pain.  Wash the area very closely.  If the area continues to keep swelling, return to the ER.  At that time you will need to have additional imaging and/or workup.    Contact primary doctor for follow-up

## 2025-05-31 NOTE — ED PROVIDER NOTES
Patient Seen in: [facility]        History  Chief Complaint   Patient presents with    Sore Throat     Stated Complaint: throat problem    Subjective:   HPI            Patient is a 40-year-old female is present emergency room with complaints of unilateral swelling of her with pain on the right tonsil.  Patient states she woke up with this morning.  She started noticing some mild discomfort yesterday evening.  Went to bed and when she woke this morning she noticed that the area of the right tonsil was more swollen than the left.  She is able to swallow and drink.  No drooling.  No fevers or chills.  No headache.  No ear pain.  She is here now to be further evaluated      Objective:     Past Medical History:    Anxiety    Chest pain, unspecified    Dizziness    Essential hypertension    worked up for secondary htn, negative; diagnosed at 23, runs strongly in mom's side    Hidradenitis suppurativa    hx recurrent inguinal boils s/p partial vulvectomy    Restless legs syndrome    Shortness of breath              Past Surgical History:   Procedure Laterality Date    Added finger surgery      Colonoscopy,biopsy  4/9/10    Performed by MARV CARLOS at [facility] [place], [facility]                Social History     Socioeconomic History    Marital status:     Number of children: 0   Occupational History    Occupation: Massage therapist     Employer: [employer]   Tobacco Use    Smoking status: Former     Current packs/day: 0.50     Average packs/day: 0.5 packs/day for 9.0 years (4.5 ttl pk-yrs)     Types: Cigarettes     Passive exposure: Past    Smokeless tobacco: Never    Tobacco comments:     quit 11/07   Vaping Use    Vaping status: Every Day    Substances: Nicotine, THC    Devices: Pre-filled or refillable cartridge   Substance and Sexual Activity    Alcohol use: Yes     Alcohol/week: 5.0 standard drinks of alcohol     Types: 5 Standard drinks or equivalent per week    Drug use: Yes     Types:  Cannabis     Comment: weekends     Social Drivers of Health      Received from [facility]    Housing Stability              Review of Systems   Constitutional:  Negative for chills, fatigue and fever.   HENT:  Positive for sore throat. Negative for congestion, postnasal drip and trouble swallowing.    Respiratory:  Negative for shortness of breath.    Cardiovascular:  Negative for chest pain.       Positive for stated complaint: throat problem  Other systems are as noted in HPI.  Constitutional and vital signs reviewed.      All other systems reviewed and negative except as noted above.                  Physical Exam    ED Triage Vitals [05/31/25 0946]   /78   Pulse 92   Resp 20   Temp 98.4 °F (36.9 °C)   Temp src Oral   SpO2 97 %   O2 Device None (Room air)       Current Vitals:   Vital Signs  BP: 126/78  Pulse: 92  Resp: 20  Temp: 98.4 °F (36.9 °C)  Temp src: Oral    Oxygen Therapy  SpO2: 97 %  O2 Device: None (Room air)            Physical Exam  Vitals and nursing note reviewed.   Constitutional:       Appearance: She is well-developed and normal weight.   HENT:      Head: Normocephalic.      Mouth/Throat:      Mouth: Mucous membranes are moist. No oral lesions.      Pharynx: No uvula swelling.      Tonsils: 1+ on the right. 0 on the left.      Comments: Posterior pharynx shows that there is mild swelling of the right tonsillar area when compared to the left.  Uvula is midline.  No trismus.  There is no exudate.  The peritonsillar area in that location on the right side is slightly swollen also.  No dental infection.  Tongue and buccal mucosa are unremarkable  Eyes:      Conjunctiva/sclera: Conjunctivae normal.      Pupils: Pupils are equal, round, and reactive to light.   Neck:      Comments: Mild tenderness along the anterior cervical lymph change on the right side  Cardiovascular:      Rate and Rhythm: Normal rate and regular rhythm.      Heart sounds: Normal heart sounds.    Pulmonary:      Effort: Pulmonary effort is normal.      Breath sounds: Normal breath sounds.   Musculoskeletal:      Cervical back: Normal range of motion and neck supple.   Skin:     General: Skin is warm and dry.      Capillary Refill: Capillary refill takes less than 2 seconds.   Neurological:      General: No focal deficit present.      Mental Status: She is alert and oriented to person, place, and time.                 ED Course  Labs Reviewed - No data to display    ED Course as of 05/31/25 1002  ------------------------------------------------------------  Time: 05/31 0995  Comment: Discussed above findings with the patient.  Will discharge patient home.  Patient has unilateral swelling of tonsillar area on the right side.  There is no trismus.  No shift of the uvula.  Tongue and buccal mucosa are unremarkable.  Will cover with antibiotics prophylactically and have patient monitor symptoms closely.  Advised if her symptoms should progress, change or worsen that she should return to an ER to be further evaluated.                       MDM     Pertinent Labs & Imaging studies reviewed. (See chart for details)  Differential diagnosis considered but not limited to:   Patient coming in with the above complaints.  Above findings are noted.  Patient has unilateral swelling of the right tonsil and compared to the left.  There is no trismus.  No identifiable abscess or peritonsillar abscess at this time.  However will cover patient prophylactic with antibiotic.  Advised to monitor symptoms closely.  Please see discussion above.    Patient is comfortable with this plan.  Overall Pt looks good. Non-toxic, well-hydrated and in no respiratory distress. Vital signs are reassuring. Exam is reassuring. I do not believe pt  requires and additional  diagnostic studiesor intervention. I believe pt  can be discharged home to continue evaluation as an outpatient. Follow-up provider given. Discharge instructions given and  reviewed. Return for any problems. All understand and agreewith the plan.    Please note that this report has been produced using speech recognition software and may contain errors related to that system including, but not limited to, errors in grammar, punctuation, and spelling, as well as words and phrases that possibly may have been recognized inappropriately.  If there are any questions or concerns, contact the dictating provider for clarification.         Medical Decision Making  Problems Addressed:  Sore throat: undiagnosed new problem with uncertain prognosis        Disposition and Plan     Clinical Impression:  1. Sore throat         Disposition:  Discharge  5/31/2025 10:00 am    Follow-up:  Kasey Holloway MD  [address] [facility] [address]  [phone]    Call in 2 days            Medications Prescribed:  Current Discharge Medication List        START taking these medications    Details   amoxicillin clavulanate 875-125 MG Oral Tab Take 1 tablet by mouth 2 (two) times daily for 7 days.  Qty: 14 tablet, Refills: 0                   Supplementary Documentation:

## 2025-06-02 RX ORDER — CLONAZEPAM 0.5 MG/1
0.5 TABLET ORAL
Qty: 30 TABLET | Refills: 0 | Status: SHIPPED | OUTPATIENT
Start: 2025-06-02

## 2025-06-02 NOTE — TELEPHONE ENCOUNTER
CLONAZEPAM 0.5MG TABLETS     Pt failed refill protocol for the following reasons:  Controlled Substance Medication [id number]/31/2025 09:39 PM    This medication is a controlled substance - forward to provider to refill    Medication is active on med list   Last refill: 2/18/2025, for #30, 0 refill  Last Px: 9/18/2024  Last appt: 9/18/2024  Next appt: No future appointments.      Forward to Dr. Sherlyn Holloway, please advise on refills. Thank you.

## 2025-06-02 NOTE — TELEPHONE ENCOUNTER
Advised patient of Dr. Lucio's note below. Patient verbalized understanding and states she receives refill of this medication every 3 months or so. Patient aware. No further questions at this time.

## 2025-06-09 DIAGNOSIS — F41.9 ANXIETY: ICD-10-CM

## 2025-06-09 DIAGNOSIS — R00.0 RAPID RESTING HEART RATE: ICD-10-CM

## 2025-06-09 DIAGNOSIS — I10 ESSENTIAL HYPERTENSION: ICD-10-CM

## 2025-06-10 DIAGNOSIS — F41.9 ANXIETY: ICD-10-CM

## 2025-06-10 DIAGNOSIS — R00.0 RAPID RESTING HEART RATE: ICD-10-CM

## 2025-06-10 DIAGNOSIS — I10 ESSENTIAL HYPERTENSION: ICD-10-CM

## 2025-06-10 RX ORDER — METOPROLOL SUCCINATE 50 MG/1
50 TABLET, EXTENDED RELEASE ORAL DAILY
Qty: 90 TABLET | Refills: 0 | OUTPATIENT
Start: 2025-06-10

## 2025-06-10 RX ORDER — METOPROLOL SUCCINATE 50 MG/1
50 TABLET, EXTENDED RELEASE ORAL DAILY
Qty: 90 TABLET | Refills: 0 | Status: SHIPPED | OUTPATIENT
Start: 2025-06-10

## 2025-06-10 NOTE — TELEPHONE ENCOUNTER
PT CALLED AND ADV SHE RECEIVED NOTIFICATION THAT [facility] ONLY FILLS 30 DAYS SUPPLY OF MEDICATION    PT ONLY HAS 1 PILL LEFT AND WILL BE OUT OF MEDS.    METOPROLOL SUCCINATE ER 50 MG Oral Tablet 24 Hr     [facility]     THANK YOU

## 2025-06-10 NOTE — TELEPHONE ENCOUNTER
Hypertension Medications Protocol [id number]/09/2025 03:37 PM   Protocol Details CMP or BMP in past 12 months    Last BP reading less than 140/90    In person appointment or virtual visit in the past 12 mos or appointment in next 3 mos    EGFRCR or GFRNAA > 50    Medication is active on med list

## 2025-06-10 NOTE — TELEPHONE ENCOUNTER
Last OV:09/18/2025 physical   Last refill:04/14/2025 90 tabs, 0 refill   BP Readings from Last 3 Encounters:   05/31/25 126/78   09/18/24 114/82   05/01/24 128/88       Medication pended, please sign if appropriate

## 2025-08-12 DIAGNOSIS — R00.0 RAPID RESTING HEART RATE: ICD-10-CM

## 2025-08-12 DIAGNOSIS — F41.9 ANXIETY: ICD-10-CM

## 2025-08-12 DIAGNOSIS — I10 ESSENTIAL HYPERTENSION: ICD-10-CM

## 2025-08-14 RX ORDER — METOPROLOL SUCCINATE 50 MG/1
50 TABLET, EXTENDED RELEASE ORAL DAILY
Qty: 90 TABLET | Refills: 0 | Status: SHIPPED | OUTPATIENT
Start: 2025-08-14

## 2025-08-27 DIAGNOSIS — F41.9 ANXIETY: ICD-10-CM

## 2025-08-29 RX ORDER — CLONAZEPAM 0.5 MG/1
0.5 TABLET ORAL
Qty: 30 TABLET | Refills: 0 | Status: SHIPPED | OUTPATIENT
Start: 2025-08-29

## (undated) NOTE — LETTER
Leah Marcella Villafana   [address]  [place] [address]           Dear Jane Standard records indicate that you have outstanding lab work and or testing that was ordered for you and has not yet been completed:  Lab Frequency Next Occurrence   Hepatic Function Panel (7) [E] Once 12/24/2023   Lipid Panel [E] Once 12/24/2023      To provide you with the best possible care, please complete these orders at your earliest convenience. If you have recently completed these orders please disregard this letter. If you have any questions please call the office at [phone].      Thank you,     [facility]

## (undated) NOTE — LETTER
Leah Garcia   [address] [facility] [address]           Dear Leah Garcia     Our records indicate that you have outstanding lab work and or testing that was ordered for you and has not yet been completed:  Lab Frequency Next Occurrence   Hepatic Function Panel (7) [E] Once 12/24/2023   Lipid Panel [E] Once 12/24/2023      To provide you with the best possible care, please complete these orders at your earliest convenience. If you have recently completed these orders please disregard this letter.     If you have any questions please call the office at [phone].     Thank you,     [facility]